# Patient Record
Sex: FEMALE | Race: WHITE | NOT HISPANIC OR LATINO | Employment: OTHER | URBAN - METROPOLITAN AREA
[De-identification: names, ages, dates, MRNs, and addresses within clinical notes are randomized per-mention and may not be internally consistent; named-entity substitution may affect disease eponyms.]

---

## 2017-01-12 ENCOUNTER — OFFICE VISIT (OUTPATIENT)
Dept: ORTHOPEDICS | Facility: CLINIC | Age: 67
End: 2017-01-12

## 2017-01-12 DIAGNOSIS — M17.32 POST-TRAUMATIC OSTEOARTHRITIS OF LEFT KNEE: Primary | ICD-10-CM

## 2017-01-12 ASSESSMENT — ENCOUNTER SYMPTOMS
RECTAL PAIN: 0
ABDOMINAL PAIN: 0
VOMITING: 0
HOT FLASHES: 0
TACHYCARDIA: 0
SYNCOPE: 0
BLOOD IN STOOL: 0
EXERCISE INTOLERANCE: 1
FLANK PAIN: 0
JOINT SWELLING: 1
SINUS PAIN: 0
PALPITATIONS: 0
DIFFICULTY URINATING: 0
MUSCLE CRAMPS: 1
NAUSEA: 0
SINUS CONGESTION: 0
MUSCLE WEAKNESS: 1
LIGHT-HEADEDNESS: 0
DIARRHEA: 1
NECK PAIN: 0
MYALGIAS: 1
BLOATING: 0
HOARSE VOICE: 0
RECTAL BLEEDING: 0
HEMATURIA: 1
HEARTBURN: 0
TROUBLE SWALLOWING: 1
HYPOTENSION: 0
HYPERTENSION: 0
TASTE DISTURBANCE: 0
CONSTIPATION: 0
STIFFNESS: 0
JAUNDICE: 0
CLAUDICATION: 1
LEG PAIN: 1
SORE THROAT: 0
DYSURIA: 0
ARTHRALGIAS: 1
BOWEL INCONTINENCE: 0
DECREASED LIBIDO: 1
SLEEP DISTURBANCES DUE TO BREATHING: 0
BACK PAIN: 1
LEG SWELLING: 1
ORTHOPNEA: 0

## 2017-01-12 ASSESSMENT — KOOS JR
HOW SEVERE IS YOUR KNEE STIFFNESS AFTER FIRST WAKING IN MORNING: MODERATE
HOW SEVERE IS YOUR KNEE STIFFNESS AFTER FIRST WAKING IN MORNING: 1

## 2017-01-12 ASSESSMENT — ACTIVITIES OF DAILY LIVING (ADL): FUNCTION,_DAILY_LIVING_SCORE: 55.88

## 2017-01-12 NOTE — PROGRESS NOTES
Jada is seen in preoperative consultation for upcoming left knee surgery.  MRI was reviewed and obtained today which further reiterates the fact she is a great candidate for left knee medial Uni arthroplasty.  MRI was reviewed.  X-rays were reviewed.  She continues to localize her pain as 100% medial.  At this time, we will go ahead and schedule her left knee medial Uni arthroplasty with total knee backup.  Risks, benefits and postoperative complications were all discussed in detail.      Total time spent was 30 minutes in face-to-face consultation and preoperative planning.      DIAGNOSIS:  Left knee medial posttraumatic osteoarthritis.

## 2017-01-12 NOTE — Clinical Note
1/12/2017       RE: Jada Billings  [address] [facility] [address]     Dear Colleague,    Thank you for referring your patient, Jada Billings, to the [facility]TH[facility]IC at [facility]. Please see a copy of my visit note below.    Jdaa is seen in preoperative consultation for upcoming left knee surgery.  MRI was reviewed and obtained today which further reiterates the fact she is a great candidate for left knee medial Uni arthroplasty.  MRI was reviewed.  X-rays were reviewed.  She continues to localize her pain as 100% medial.  At this time, we will go ahead and schedule her left knee medial Uni arthroplasty with total knee backup.  Risks, benefits and postoperative complications were all discussed in detail.      Total time spent was 30 minutes in face-to-face consultation and preoperative planning.      DIAGNOSIS:  Left knee medial posttraumatic osteoarthritis.       Again, thank you for allowing me to participate in the care of your patient.      Sincerely,    Edin Abraham MD

## 2017-03-21 ENCOUNTER — HOSPITAL ENCOUNTER (OUTPATIENT)
Dept: EDUCATION SERVICES | Facility: CLINIC | Age: 67
End: 2017-03-21
Payer: MEDICARE

## 2017-04-06 DIAGNOSIS — M17.12 PRIMARY OSTEOARTHRITIS OF LEFT KNEE: Primary | ICD-10-CM

## 2017-04-13 ENCOUNTER — APPOINTMENT (OUTPATIENT)
Dept: GENERAL RADIOLOGY | Facility: CLINIC | Age: 67
End: 2017-04-13
Attending: ORTHOPAEDIC SURGERY
Payer: MEDICARE

## 2017-04-13 ENCOUNTER — SURGERY (OUTPATIENT)
Age: 67
End: 2017-04-13

## 2017-04-13 ENCOUNTER — ANESTHESIA (OUTPATIENT)
Dept: SURGERY | Facility: CLINIC | Age: 67
End: 2017-04-13
Payer: MEDICARE

## 2017-04-13 PROBLEM — Z41.9 ELECTIVE SURGERY: Status: ACTIVE | Noted: 2017-04-13

## 2017-04-13 PROBLEM — Z98.890 STATUS POST KNEE SURGERY: Status: ACTIVE | Noted: 2017-04-13

## 2017-04-13 PROBLEM — M17.9 KNEE OSTEOARTHRITIS: Status: ACTIVE | Noted: 2017-04-13

## 2017-04-13 PROCEDURE — C9290 INJ, BUPIVACAINE LIPOSOME: HCPCS | Performed by: ANESTHESIOLOGY

## 2017-04-13 PROCEDURE — 25000125 ZZHC RX 250: Performed by: NURSE ANESTHETIST, CERTIFIED REGISTERED

## 2017-04-13 PROCEDURE — 40000986 XR KNEE PORT LT 1/2 VW: Mod: LT

## 2017-04-13 PROCEDURE — 25000128 H RX IP 250 OP 636: Performed by: ANESTHESIOLOGY

## 2017-04-13 PROCEDURE — 25000125 ZZHC RX 250: Performed by: STUDENT IN AN ORGANIZED HEALTH CARE EDUCATION/TRAINING PROGRAM

## 2017-04-13 PROCEDURE — 25000128 H RX IP 250 OP 636: Performed by: NURSE ANESTHETIST, CERTIFIED REGISTERED

## 2017-04-13 PROCEDURE — 25000125 ZZHC RX 250: Performed by: ORTHOPAEDIC SURGERY

## 2017-04-13 PROCEDURE — 25800025 ZZH RX 258: Performed by: NURSE ANESTHETIST, CERTIFIED REGISTERED

## 2017-04-13 PROCEDURE — S0077 INJECTION, CLINDAMYCIN PHOSP: HCPCS | Performed by: ORTHOPAEDIC SURGERY

## 2017-04-13 PROCEDURE — 25000128 H RX IP 250 OP 636: Performed by: ORTHOPAEDIC SURGERY

## 2017-04-13 RX ORDER — BUPIVACAINE HYDROCHLORIDE AND EPINEPHRINE 2.5; 5 MG/ML; UG/ML
INJECTION, SOLUTION INFILTRATION; PERINEURAL PRN
Status: DISCONTINUED | OUTPATIENT
Start: 2017-04-13 | End: 2017-04-13

## 2017-04-13 RX ORDER — ONDANSETRON 2 MG/ML
INJECTION INTRAMUSCULAR; INTRAVENOUS PRN
Status: DISCONTINUED | OUTPATIENT
Start: 2017-04-13 | End: 2017-04-13

## 2017-04-13 RX ORDER — SODIUM CHLORIDE, SODIUM LACTATE, POTASSIUM CHLORIDE, CALCIUM CHLORIDE 600; 310; 30; 20 MG/100ML; MG/100ML; MG/100ML; MG/100ML
INJECTION, SOLUTION INTRAVENOUS CONTINUOUS PRN
Status: DISCONTINUED | OUTPATIENT
Start: 2017-04-13 | End: 2017-04-13

## 2017-04-13 RX ORDER — FENTANYL CITRATE 50 UG/ML
INJECTION, SOLUTION INTRAMUSCULAR; INTRAVENOUS PRN
Status: DISCONTINUED | OUTPATIENT
Start: 2017-04-13 | End: 2017-04-13

## 2017-04-13 RX ORDER — DEXAMETHASONE SODIUM PHOSPHATE 4 MG/ML
INJECTION, SOLUTION INTRA-ARTICULAR; INTRALESIONAL; INTRAMUSCULAR; INTRAVENOUS; SOFT TISSUE PRN
Status: DISCONTINUED | OUTPATIENT
Start: 2017-04-13 | End: 2017-04-13

## 2017-04-13 RX ORDER — PROPOFOL 10 MG/ML
INJECTION, EMULSION INTRAVENOUS CONTINUOUS PRN
Status: DISCONTINUED | OUTPATIENT
Start: 2017-04-13 | End: 2017-04-13

## 2017-04-13 RX ORDER — LIDOCAINE HYDROCHLORIDE 20 MG/ML
INJECTION, SOLUTION INFILTRATION; PERINEURAL PRN
Status: DISCONTINUED | OUTPATIENT
Start: 2017-04-13 | End: 2017-04-13

## 2017-04-13 RX ADMIN — PHENYLEPHRINE HYDROCHLORIDE 100 MCG: 10 INJECTION, SOLUTION INTRAMUSCULAR; INTRAVENOUS; SUBCUTANEOUS at 09:12

## 2017-04-13 RX ADMIN — PHENYLEPHRINE HYDROCHLORIDE 100 MCG: 10 INJECTION, SOLUTION INTRAMUSCULAR; INTRAVENOUS; SUBCUTANEOUS at 09:32

## 2017-04-13 RX ADMIN — PROPOFOL 125 MCG/KG/MIN: 10 INJECTION, EMULSION INTRAVENOUS at 08:29

## 2017-04-13 RX ADMIN — ONDANSETRON 4 MG: 2 INJECTION INTRAMUSCULAR; INTRAVENOUS at 09:55

## 2017-04-13 RX ADMIN — LIDOCAINE HYDROCHLORIDE 20 MG: 20 INJECTION, SOLUTION INFILTRATION; PERINEURAL at 08:29

## 2017-04-13 RX ADMIN — PHENYLEPHRINE HYDROCHLORIDE 100 MCG: 10 INJECTION, SOLUTION INTRAMUSCULAR; INTRAVENOUS; SUBCUTANEOUS at 09:17

## 2017-04-13 RX ADMIN — BUPIVACAINE HYDROCHLORIDE AND EPINEPHRINE BITARTRATE 4 ML: 5; .005 INJECTION, SOLUTION PERINEURAL at 08:55

## 2017-04-13 RX ADMIN — CLINDAMYCIN PHOSPHATE 900 MG: 18 INJECTION, SOLUTION INTRAVENOUS at 08:20

## 2017-04-13 RX ADMIN — SODIUM CHLORIDE 1000 ML: 900 IRRIGANT IRRIGATION at 10:03

## 2017-04-13 RX ADMIN — DEXAMETHASONE SODIUM PHOSPHATE 4 MG: 4 INJECTION, SOLUTION INTRAMUSCULAR; INTRAVENOUS at 08:35

## 2017-04-13 RX ADMIN — SODIUM CHLORIDE, POTASSIUM CHLORIDE, SODIUM LACTATE AND CALCIUM CHLORIDE: 600; 310; 30; 20 INJECTION, SOLUTION INTRAVENOUS at 08:20

## 2017-04-13 RX ADMIN — ROPIVACAINE HYDROCHLORIDE 50 ML: 10 INJECTION, SOLUTION EPIDURAL at 09:52

## 2017-04-13 RX ADMIN — BUPIVACAINE HYDROCHLORIDE AND EPINEPHRINE BITARTRATE 10 ML: 2.5; .005 INJECTION, SOLUTION INFILTRATION; PERINEURAL at 07:30

## 2017-04-13 RX ADMIN — FENTANYL CITRATE 50 MCG: 50 INJECTION, SOLUTION INTRAMUSCULAR; INTRAVENOUS at 08:20

## 2017-04-13 RX ADMIN — MIDAZOLAM HYDROCHLORIDE 2 MG: 1 INJECTION, SOLUTION INTRAMUSCULAR; INTRAVENOUS at 08:20

## 2017-04-13 RX ADMIN — ROPIVACAINE HYDROCHLORIDE 50 ML: 10 INJECTION, SOLUTION EPIDURAL at 09:53

## 2017-04-13 RX ADMIN — SODIUM CHLORIDE 1 G: 9 INJECTION, SOLUTION INTRAVENOUS at 08:33

## 2017-04-13 RX ADMIN — BUPIVACAINE 10 ML: 13.3 INJECTION, SUSPENSION, LIPOSOMAL INFILTRATION at 07:30

## 2017-04-14 ENCOUNTER — APPOINTMENT (OUTPATIENT)
Dept: OCCUPATIONAL THERAPY | Facility: CLINIC | Age: 67
End: 2017-04-14
Attending: ORTHOPAEDIC SURGERY
Payer: MEDICARE

## 2017-04-14 ENCOUNTER — APPOINTMENT (OUTPATIENT)
Dept: PHYSICAL THERAPY | Facility: CLINIC | Age: 67
End: 2017-04-14
Attending: ORTHOPAEDIC SURGERY
Payer: MEDICARE

## 2017-04-19 DIAGNOSIS — Z96.652 STATUS POST LEFT UNICOMPARTMENTAL KNEE REPLACEMENT: Primary | ICD-10-CM

## 2017-04-27 ENCOUNTER — OFFICE VISIT (OUTPATIENT)
Dept: ORTHOPEDICS | Facility: CLINIC | Age: 67
End: 2017-04-27

## 2017-04-27 DIAGNOSIS — Z96.652 STATUS POST LEFT PARTIAL KNEE REPLACEMENT: Primary | ICD-10-CM

## 2017-04-27 RX ORDER — OXYCODONE HYDROCHLORIDE 5 MG/1
5 TABLET ORAL EVERY 4 HOURS PRN
Qty: 40 TABLET | Refills: 0 | Status: SHIPPED | OUTPATIENT
Start: 2017-04-27 | End: 2017-05-08

## 2017-04-27 NOTE — LETTER
4/27/2017       RE: Jada Billigns  [address] [facility] [address]     Dear Colleague,    Thank you for referring your patient, Jada Billings, to the [facility] ORTHOPAEDIC CLINIC at [facility]. Please see a copy of my visit note below.    HISTORY OF PRESENT ILLNESS:  Jada is seen 2 weeks post op of her left knee medial uni arthroplasty.  She reports marked improvement compared to her preoperative pain.  Denies fever or chills.  She is taking oxycodone every 6-7 hours for pain, and is weaning off of this.  She denies night pain.  She is doing outpatient PT now.      PHYSICAL EXAMINATION:  Incision is seen; clean, healed, benign, and intact with no acute neurocirculatory deficits or drainage.  She has mild swelling.  Alignment is near neutral.  She has -8 degrees to 100 degrees of motion today.  Quad strength is 5/5 with no acute neurocirculatory deficits.      IMAGING:  X-rays were taken, which show good fit of the tibiofemoral components with ideal alignment.      PLAN:  The natural progression and plan of care was discussed.  I did reassure her that she is progressing well.  She will continue to focus on extension exercises.  She will continue the aspirin daily for DVT prophylaxis.  She will follow up in 4-6 weeks for an AP and lateral x-ray of her left knee on arrival, or sooner should she have increasing pain, problems or other complications.         Again, thank you for allowing me to participate in the care of your patient.      Sincerely,    MELA Mcdermott CNP

## 2017-04-27 NOTE — MR AVS SNAPSHOT
After Visit Summary   2017    Jada Billings    MRN: [id number]           Patient Information     Date Of Birth          1950        Visit Information        Provider Department      2017 10:45 AM Lupe Du, APRN CNP [facility]opa[facility]        Today's Diagnoses     Status post left partial knee replacement    -  1       Follow-ups after your visit        Your next 10 appointments already scheduled     May 25, 2017  2:30 PM CDT   (Arrive by 2:15 PM)   Return Visit with MD NIC Melgar [facility] ([facility] [place])    [address] [place] [address] [phone]              Who to contact     Please call your clinic at [phone] to:    Ask questions about your health    Make or cancel appointments    Discuss your medicines    Learn about your test results    Speak to your doctor   If you have compliments or concerns about an experience at your clinic, or if you wish to file a complaint, please contact [facility] Patient Relations at [phone] or email us at [email or web address]         Additional Information About Your Visit        MyChart Information     OrthoSensort is an electronic gateway that provides easy, online access to your medical records. With [employer], you can request a clinic appointment, read your test results, renew a prescription or communicate with your care team.     To sign up for OrthoSensort visit the website at [email or web address]   You will be asked to enter the access code listed below, as well as some personal information. Please follow the directions to create your username and password.     Your access code is: [email or web address]  Expires: 2017  6:31 AM     Your access code will  in 90 days. If you need help or a new code, please contact your [facility] or call [phone] for  assistance.        Care EveryWhere ID     This is your Care EveryWhere ID. This could be used by other organizations to access your [place] medical records  [id number]         Blood Pressure from Last 3 Encounters:   04/14/17 121/71    Weight from Last 3 Encounters:   04/13/17 88.2 kg (194 lb 7.1 oz)   12/01/16 87.1 kg (192 lb)              Today, you had the following     No orders found for display         Today's Medication Changes          These changes are accurate as of: 4/27/17 11:59 PM.  If you have any questions, ask your nurse or doctor.               These medicines have changed or have updated prescriptions.        Dose/Directions    meloxicam 15 MG tablet   Commonly known as:  MOBIC   This may have changed:  additional instructions   Used for:  Post-traumatic osteoarthritis of left knee        Dose:  15 mg   Take 1 tablet (15 mg) by mouth daily   Quantity:  30 tablet   Refills:  3       * oxyCODONE 5 MG IR tablet   Commonly known as:  ROXICODONE   This may have changed:  Another medication with the same name was added. Make sure you understand how and when to take each.   Used for:  S/P knee replacement        Dose:  5-10 mg   Take 1-2 tablets (5-10 mg) by mouth every 3 hours as needed for pain or other (Moderate to Severe)   Quantity:  60 tablet   Refills:  0       * oxyCODONE 5 MG IR tablet   Commonly known as:  ROXICODONE   This may have changed:  You were already taking a medication with the same name, and this prescription was added. Make sure you understand how and when to take each.   Used for:  Status post left partial knee replacement   Changed by:  Lupe Du, MELA CNP        Dose:  5 mg   Take 1 tablet (5 mg) by mouth every 4 hours as needed for moderate to severe pain   Quantity:  40 tablet   Refills:  0       * Notice:  This list has 2 medication(s) that are the same as other medications prescribed for you. Read the directions carefully, and ask your doctor or other care provider  to review them with you.         Where to get your medicines      Some of these will need a paper prescription and others can be bought over the counter.  Ask your nurse if you have questions.     Bring a paper prescription for each of these medications     oxyCODONE 5 MG IR tablet                Primary Care Provider Office Phone # Fax #    Arlin Barrios [phone]       [facility] [address]  [place] [address]        Thank you!     Thank you for choosing [facility] ORTHOPAEDIC CLINIC  for your care. Our goal is always to provide you with excellent care. Hearing back from our patients is one way we can continue to improve our services. Please take a few minutes to complete the written survey that you may receive in the mail after your visit with us. Thank you!             Your Updated Medication List - Protect others around you: Learn how to safely use, store and throw away your medicines at www.disposemymeds.org.          This list is accurate as of: 4/27/17 11:59 PM.  Always use your most recent med list.                   Brand Name Dispense Instructions for use    amitriptyline 10 MG tablet    ELAVIL     Take 10 mg by mouth At Bedtime       aspirin  MG EC tablet     30 tablet    Take 1 tablet (325 mg) by mouth daily       clonazePAM 1 MG tablet    klonoPIN     one tablet at HS       CYMBALTA 30 MG EC capsule   Generic drug:  DULoxetine      one capsule daily in the evening       Dextran 70 0.1%-Hypromellose 0.3% 0.1-0.3 % Soln ophthalmic solution      As needed for dry eyes  Indications: DRY EYE       GENTEAL SEVERE 0.3 % Gel ophthalmic gel   Generic drug:  hypromellose      Use as needed at bedtime for dry eye       meloxicam 15 MG tablet    MOBIC    30 tablet    Take 1 tablet (15 mg) by mouth daily       niacin 500 MG CR capsule      Take 500 mg by mouth At Bedtime       * oxyCODONE 5 MG IR tablet    ROXICODONE    60 tablet    Take 1-2 tablets (5-10 mg)  by mouth every 3 hours as needed for pain or other (Moderate to Severe)       * oxyCODONE 5 MG IR tablet    ROXICODONE    40 tablet    Take 1 tablet (5 mg) by mouth every 4 hours as needed for moderate to severe pain       progesterone 100 MG capsule    PROMETRIUM     Take 100 mg by mouth At Bedtime 2 capsules       simvastatin 40 MG tablet    ZOCOR     Take 40 mg by mouth At Bedtime       SYSTANE ULTRA 0.4-0.3 % Soln ophthalmic solution   Generic drug:  polyethylene glycol 0.4%- propylene glycol 0.3%      Use as needed for dry eyes       VITAMIN D-1000 MAX ST 1000 UNITS Tabs   Generic drug:  cholecalciferol      Take 2,000 Units by mouth daily       * Notice:  This list has 2 medication(s) that are the same as other medications prescribed for you. Read the directions carefully, and ask your doctor or other care provider to review them with you.

## 2017-04-27 NOTE — PROGRESS NOTES
HISTORY OF PRESENT ILLNESS:  Jada is seen 2 weeks post op of her left knee medial uni arthroplasty.  She reports marked improvement compared to her preoperative pain.  Denies fever or chills.  She is taking oxycodone every 6-7 hours for pain, and is weaning off of this.  She denies night pain.  She is doing outpatient PT now.      PHYSICAL EXAMINATION:  Incision is seen; clean, healed, benign, and intact with no acute neurocirculatory deficits or drainage.  She has mild swelling.  Alignment is near neutral.  She has -8 degrees to 100 degrees of motion today.  Quad strength is 5/5 with no acute neurocirculatory deficits.      IMAGING:  X-rays were taken, which show good fit of the tibiofemoral components with ideal alignment.      PLAN:  The natural progression and plan of care was discussed.  I did reassure her that she is progressing well.  She will continue to focus on extension exercises.  She will continue the aspirin daily for DVT prophylaxis.  She will follow up in 4-6 weeks for an AP and lateral x-ray of her left knee on arrival, or sooner should she have increasing pain, problems or other complications.

## 2017-05-08 DIAGNOSIS — Z96.652 STATUS POST LEFT PARTIAL KNEE REPLACEMENT: ICD-10-CM

## 2017-05-08 RX ORDER — OXYCODONE HYDROCHLORIDE 5 MG/1
TABLET ORAL
Qty: 40 TABLET | Refills: 0 | Status: SHIPPED | OUTPATIENT
Start: 2017-05-08 | End: 2017-07-27

## 2017-05-08 NOTE — TELEPHONE ENCOUNTER
Orthopedic pt of Dr. Abraham s/p Left knee unicompartmental knee arthroplasty on 4/13/17. Jada calls today requesting refill of pain med. She's currently taking approx 1 oxycodone every 6-8 hours. Will refill per standing orders. Jada states she's completely out of med and requests to p/u at the [facility].  Signed Rx taken to first floor for p/u.

## 2017-05-15 DIAGNOSIS — Z96.652 STATUS POST LEFT UNICOMPARTMENTAL KNEE REPLACEMENT: Primary | ICD-10-CM

## 2017-05-25 ENCOUNTER — OFFICE VISIT (OUTPATIENT)
Dept: ORTHOPEDICS | Facility: CLINIC | Age: 67
End: 2017-05-25

## 2017-05-25 DIAGNOSIS — Z96.652 STATUS POST LEFT PARTIAL KNEE REPLACEMENT: Primary | ICD-10-CM

## 2017-05-25 ASSESSMENT — ENCOUNTER SYMPTOMS
SLEEP DISTURBANCES DUE TO BREATHING: 0
BACK PAIN: 1
TACHYCARDIA: 0
PALPITATIONS: 0
HYPERTENSION: 0
EXERCISE INTOLERANCE: 0
ORTHOPNEA: 0
MUSCLE CRAMPS: 0
LEG SWELLING: 1
HYPOTENSION: 0
LIGHT-HEADEDNESS: 0
NECK PAIN: 0
SYNCOPE: 0
JOINT SWELLING: 1
LEG PAIN: 1
MUSCLE WEAKNESS: 1
MYALGIAS: 1
ARTHRALGIAS: 1
STIFFNESS: 1
CLAUDICATION: 0

## 2017-05-25 NOTE — PROGRESS NOTES
HISTORY OF PRESENT ILLNESS:  Jada is 6 weeks post her left medial Uni arthroplasty.  She reports marked improved compared to preoperative pain.  She is able to walk comfortably now, is taking nothing for pain.  Continues to do rehabilitation.      PHYSICAL EXAMINATION:  Incision is seen clean, healed, benign and intact.  She has 0-124 degrees of motion.  She has no acute neurocirculatory deficits, no pain to palpate the calf.  No erythema, no effusion.  Alignment is near neutral.      IMAGING:  X-rays were taken, which show good fit of the tibial and femoral components with ideal alignment.      DIAGNOSIS:  Left knee medial Uni arthroplasty.      PLAN:  The natural progression and plan of care was discussed.  She will continue to work on range of motion, isometrics and strengthening, following up in 2-3 months for AP/lateral x-ray of her left knee on arrival.

## 2017-05-25 NOTE — LETTER
5/25/2017       RE: Jada Billings  [address] [facility] [address]     Dear Colleague,    Thank you for referring your patient, Jada Billings, to the [facility] ORTHOPAEDIC CLINIC at [facility]. Please see a copy of my visit note below.     Dictation on: 05/25/2017  3:22 PM by: LAINE FERGUSON [[email or web address]]         HISTORY OF PRESENT ILLNESS:  Jada is 6 weeks post her left medial Uni arthroplasty.  She reports marked improved compared to preoperative pain.  She is able to walk comfortably now, is taking nothing for pain.  Continues to do rehabilitation.      PHYSICAL EXAMINATION:  Incision is seen clean, healed, benign and intact.  She has 0-124 degrees of motion.  She has no acute neurocirculatory deficits, no pain to palpate the calf.  No erythema, no effusion.  Alignment is near neutral.      IMAGING:  X-rays were taken, which show good fit of the tibial and femoral components with ideal alignment.      DIAGNOSIS:  Left knee medial Uni arthroplasty.      PLAN:  The natural progression and plan of care was discussed.  She will continue to work on range of motion, isometrics and strengthening, following up in 2-3 months for AP/lateral x-ray of her left knee on arrival.      Again, thank you for allowing me to participate in the care of your patient.      Sincerely,  Edin Abraham MD

## 2017-05-25 NOTE — MR AVS SNAPSHOT
After Visit Summary   2017    Jada Billings    MRN: [id number]           Patient Information     Date Of Birth          1950        Visit Information        Provider Department      2017 2:30 PM Edin Abraham MD [facility]        Today's Diagnoses     Status post left partial knee replacement    -  1       Follow-ups after your visit        Your next 10 appointments already scheduled     2017  2:30 PM CDT   (Arrive by 2:15 PM)   Return Visit with MD NIC Melgar [facility] ([facility] [place])    [address] [place] [address] [phone]              Who to contact     Please call your clinic at [phone] to:    Ask questions about your health    Make or cancel appointments    Discuss your medicines    Learn about your test results    Speak to your doctor   If you have compliments or concerns about an experience at your clinic, or if you wish to file a complaint, please contact [facility] Patient Relations at [phone] or email us at [email or web address]         Additional Information About Your Visit        MyChart Information     Pro[employer]t is an electronic gateway that provides easy, online access to your medical records. With [employer], you can request a clinic appointment, read your test results, renew a prescription or communicate with your care team.     To sign up for Pro[employer]t visit the website at [email or web address]   You will be asked to enter the access code listed below, as well as some personal information. Please follow the directions to create your username and password.     Your access code is: [email or web address]  Expires: 2017  6:31 AM     Your access code will  in 90 days. If you need help or a new code, please contact your [facility] or call [phone] for assistance.         Care EveryWhere ID     This is your Care EveryWhere ID. This could be used by other organizations to access your [place] medical records  [id number]         Blood Pressure from Last 3 Encounters:   No data found for BP    Weight from Last 3 Encounters:   No data found for Wt              Today, you had the following     No orders found for display         Today's Medication Changes          These changes are accurate as of: 5/25/17 11:59 PM.  If you have any questions, ask your nurse or doctor.               These medicines have changed or have updated prescriptions.        Dose/Directions    oxyCODONE 5 MG IR tablet   Commonly known as:  ROXICODONE   This may have changed:  Another medication with the same name was removed. Continue taking this medication, and follow the directions you see here.   Used for:  Status post left partial knee replacement   Changed by:  Edin Abraham MD        Take 1 tablet by mouth every 6-8 hours, AS NEEDED, for moderate to severe post-op pain. Wean as tolerated.   Quantity:  40 tablet   Refills:  0         Stop taking these medicines if you haven't already. Please contact your care team if you have questions.     meloxicam 15 MG tablet   Commonly known as:  MOBIC   Stopped by:  Edin Abraham MD                    Primary Care Provider Office Phone # Fax #    Arlin Barrios [phone]       [facility] [address]  [place] [address]        Thank you!     Thank you for choosing [facility]  for your care. Our goal is always to provide you with excellent care. Hearing back from our patients is one way we can continue to improve our services. Please take a few minutes to complete the written survey that you may receive in the mail after your visit with us. Thank you!             Your Updated Medication List - Protect others around you: Learn how to safely use, store and throw away your medicines at  www.disposemymeds.org.          This list is accurate as of: 5/25/17 11:59 PM.  Always use your most recent med list.                   Brand Name Dispense Instructions for use    amitriptyline 10 MG tablet    ELAVIL     Take 10 mg by mouth At Bedtime       aspirin  MG EC tablet     30 tablet    Take 1 tablet (325 mg) by mouth daily       clonazePAM 1 MG tablet    klonoPIN     one tablet at HS       CYMBALTA 30 MG EC capsule   Generic drug:  DULoxetine      one capsule daily in the evening       Dextran 70 0.1%-Hypromellose 0.3% 0.1-0.3 % Soln ophthalmic solution      As needed for dry eyes  Indications: DRY EYE       DICLOFENAC SODIUM PO          GENTEAL SEVERE 0.3 % Gel ophthalmic gel   Generic drug:  hypromellose      Use as needed at bedtime for dry eye       niacin 500 MG CR capsule      Take 500 mg by mouth At Bedtime       oxyCODONE 5 MG IR tablet    ROXICODONE    40 tablet    Take 1 tablet by mouth every 6-8 hours, AS NEEDED, for moderate to severe post-op pain. Wean as tolerated.       progesterone 100 MG capsule    PROMETRIUM     Take 100 mg by mouth At Bedtime 2 capsules       simvastatin 40 MG tablet    ZOCOR     Take 40 mg by mouth At Bedtime       SYSTANE ULTRA 0.4-0.3 % Soln ophthalmic solution   Generic drug:  polyethylene glycol 0.4%- propylene glycol 0.3%      Use as needed for dry eyes       VITAMIN D-1000 MAX ST 1000 UNITS Tabs   Generic drug:  cholecalciferol      Take 2,000 Units by mouth daily

## 2017-07-21 DIAGNOSIS — Z96.652 STATUS POST LEFT UNICOMPARTMENTAL KNEE REPLACEMENT: Primary | ICD-10-CM

## 2017-07-27 ENCOUNTER — OFFICE VISIT (OUTPATIENT)
Dept: ORTHOPEDICS | Facility: CLINIC | Age: 67
End: 2017-07-27

## 2017-07-27 DIAGNOSIS — Z96.652 STATUS POST LEFT PARTIAL KNEE REPLACEMENT: Primary | ICD-10-CM

## 2017-07-27 RX ORDER — GABAPENTIN 300 MG/1
300 CAPSULE ORAL
Qty: 30 CAPSULE | Refills: 0 | Status: SHIPPED | OUTPATIENT
Start: 2017-07-27

## 2017-07-27 NOTE — MR AVS SNAPSHOT
After Visit Summary   2017    Jada Billings    MRN: [id number]           Patient Information     Date Of Birth          1950        Visit Information        Provider Department      2017 2:30 PM Eidn Abraham MD [facility]        Today's Diagnoses     Status post left partial knee replacement    -  1       Follow-ups after your visit        Who to contact     Please call your clinic at [phone] to:    Ask questions about your health    Make or cancel appointments    Discuss your medicines    Learn about your test results    Speak to your doctor   If you have compliments or concerns about an experience at your clinic, or if you wish to file a complaint, please contact [facility] Patient Relations at [phone] or email us at [email or web address]         Additional Information About Your Visit        MyChart Information     C[employer] is an electronic gateway that provides easy, online access to your medical records. With [employer], you can request a clinic appointment, read your test results, renew a prescription or communicate with your care team.     To sign up for [employer] visit the website at [email or web address]   You will be asked to enter the access code listed below, as well as some personal information. Please follow the directions to create your username and password.     Your access code is: [email or web address]  Expires: 10/11/2017  6:31 AM     Your access code will  in 90 days. If you need help or a new code, please contact your [facility] or call [phone] for assistance.        Care EveryWhere ID     This is your Care EveryWhere ID. This could be used by other organizations to access your [place] medical records  [id number]         Blood Pressure from Last 3 Encounters:   No data found for BP    Weight from Last 3 Encounters:   No data found for Wt               Today, you had the following     No orders found for display         Today's Medication Changes          These changes are accurate as of: 7/27/17 11:59 PM.  If you have any questions, ask your nurse or doctor.               Start taking these medicines.        Dose/Directions    gabapentin 300 MG capsule   Commonly known as:  NEURONTIN   Used for:  Status post left partial knee replacement   Started by:  Edin Abraham MD        Dose:  300 mg   Take 1 capsule (300 mg) by mouth nightly as needed   Quantity:  30 capsule   Refills:  0         Stop taking these medicines if you haven't already. Please contact your care team if you have questions.     oxyCODONE 5 MG IR tablet   Commonly known as:  ROXICODONE   Stopped by:  Edin Abraham MD                Where to get your medicines      These medications were sent to [facility] [address] [place] [address]     Phone:  [phone]     gabapentin 300 MG capsule                Primary Care Provider Office Phone # Fax #    Arlin Isbellery [phone]       [facility] [address]  [place] [address]        Equal Access to Services     SEAN [facility] SystemGAMAL AH: Hadii alfredo ku hadasho Soomaali, waaxda luqadaha, qaybta kaalmada adeegyada, maria luisa calloway . So [facility] [phone].    ATENCIÓN: Si habla español, tiene a mckeon disposición servicios gratuitos de asistencia lingüística. [facility] [phone].    We comply with applicable federal civil rights laws and [place] laws. We do not discriminate on the basis of race, color, national origin, age, disability sex, sexual orientation or gender identity.            Thank you!     Thank you for choosing [facility]  for your care. Our goal is always to provide you with excellent care. Hearing back from our patients is one way we can continue to improve our services. Please take a few  minutes to complete the written survey that you may receive in the mail after your visit with us. Thank you!             Your Updated Medication List - Protect others around you: Learn how to safely use, store and throw away your medicines at www.disposemymeds.org.          This list is accurate as of: 7/27/17 11:59 PM.  Always use your most recent med list.                   Brand Name Dispense Instructions for use Diagnosis    amitriptyline 10 MG tablet    ELAVIL     Take 10 mg by mouth At Bedtime        aspirin  MG EC tablet     30 tablet    Take 1 tablet (325 mg) by mouth daily    S/P knee replacement       clonazePAM 1 MG tablet    klonoPIN     one tablet at HS        CYMBALTA 30 MG EC capsule   Generic drug:  DULoxetine      one capsule daily in the evening        Dextran 70 0.1%-Hypromellose 0.3% 0.1-0.3 % Soln ophthalmic solution      As needed for dry eyes  Indications: DRY EYE        DICLOFENAC SODIUM PO           gabapentin 300 MG capsule    NEURONTIN    30 capsule    Take 1 capsule (300 mg) by mouth nightly as needed    Status post left partial knee replacement       GENTEAL SEVERE 0.3 % Gel ophthalmic gel   Generic drug:  hypromellose      Use as needed at bedtime for dry eye        niacin 500 MG CR capsule      Take 500 mg by mouth At Bedtime        progesterone 100 MG capsule    PROMETRIUM     Take 100 mg by mouth At Bedtime 2 capsules        simvastatin 40 MG tablet    ZOCOR     Take 40 mg by mouth At Bedtime        SYSTANE ULTRA 0.4-0.3 % Soln ophthalmic solution   Generic drug:  polyethylene glycol 0.4%- propylene glycol 0.3%      Use as needed for dry eyes        VITAMIN D-1000 MAX ST 1000 UNITS Tabs   Generic drug:  cholecalciferol      Take 2,000 Units by mouth daily

## 2017-07-27 NOTE — PROGRESS NOTES
"Jada is seen 3 1/2 months post left knee medial uniarthroplasty. She is doing well with continued reports of improvement in medial joint pain compared to her preop pain. Since our last visit she has been increasing her activity and has increased night pain and \"deep achiness\" with some medial knee pain distal to the joint. Taking OTC NSAIDS and using ice which provides temorary relief. Denies locking or catching and no specific new injuries. Reports some \"grinding\" that is not painful just annoying with squatting.     PMH: Reviewed    ROS: Reviewed    SH/FH:    PE:  Pleasant cooperative female alert and oriented x3. Knee incision is seen and well healed. ROM 0-112. Strength is symmetrical to the contralateral side. No erythema or joint effusion noted. + alignment.  Minimal pseudolaxity noted. Mild tender to palpate pes anserine bursa. Quad tendon intact. Minimal crepitus noted on passive motion to patella. + CMS      Xrays taken show good fit of tibial/femoral components without lucency. No acute fractures.     DX: left knee medial uniarthroplasty    Plan  1. Quad isometrics  2. neurontin 300 mg po q hs for night pain  3. Ice  4. F/U 1 year    I, Dr. Edin Abraham have seen and examined this patient with Lupe Du, APRN, CNP.      "

## 2017-07-27 NOTE — LETTER
"7/27/2017       RE: Jada Billings  [address]  [facility] [address]     Dear Colleague,    Thank you for referring your patient, Jada Bililngs, to the [facility] at [facility]. Please see a copy of my visit note below.    Jada is seen 3 1/2 months post left knee medial uniarthroplasty. She is doing well with continued reports of improvement in medial joint pain compared to her preop pain. Since our last visit she has been increasing her activity and has increased night pain and \"deep achiness\" with some medial knee pain distal to the joint. Taking OTC NSAIDS and using ice which provides temorary relief. Denies locking or catching and no specific new injuries. Reports some \"grinding\" that is not painful just annoying with squatting.     PMH: Reviewed    ROS: Reviewed    SH/FH:    PE:  Pleasant cooperative female alert and oriented x3. Knee incision is seen and well healed. ROM 0-112. Strength is symmetrical to the contralateral side. No erythema or joint effusion noted. + alignment.  Minimal pseudolaxity noted. Mild tender to palpate pes anserine bursa. Quad tendon intact. Minimal crepitus noted on passive motion to patella. + CMS      Xrays taken show good fit of tibial/femoral components without lucency. No acute fractures.     DX: left knee medial uniarthroplasty    Plan  1. Quad isometrics  2. neurontin 300 mg po q hs for night pain  3. Ice  4. F/U 1 year    Again, thank you for allowing me to participate in the care of your patient.      Sincerely,    Edin Abraham MD      "

## 2017-11-16 ENCOUNTER — TELEPHONE (OUTPATIENT)
Dept: ORTHOPEDICS | Facility: CLINIC | Age: 67
End: 2017-11-16

## 2017-11-16 DIAGNOSIS — M17.10 ARTHRITIS OF KNEE: Primary | ICD-10-CM

## 2017-12-06 ENCOUNTER — MEDICAL CORRESPONDENCE (OUTPATIENT)
Dept: HEALTH INFORMATION MANAGEMENT | Facility: CLINIC | Age: 67
End: 2017-12-06

## 2018-01-04 ENCOUNTER — MEDICAL CORRESPONDENCE (OUTPATIENT)
Dept: HEALTH INFORMATION MANAGEMENT | Facility: CLINIC | Age: 68
End: 2018-01-04

## 2018-05-15 DIAGNOSIS — Z96.652 STATUS POST LEFT UNICOMPARTMENTAL KNEE REPLACEMENT: Primary | ICD-10-CM

## 2018-05-21 ENCOUNTER — RADIANT APPOINTMENT (OUTPATIENT)
Dept: GENERAL RADIOLOGY | Facility: CLINIC | Age: 68
End: 2018-05-21
Attending: ORTHOPAEDIC SURGERY
Payer: COMMERCIAL

## 2018-05-21 ENCOUNTER — OFFICE VISIT (OUTPATIENT)
Dept: ORTHOPEDICS | Facility: CLINIC | Age: 68
End: 2018-05-21
Payer: COMMERCIAL

## 2018-05-21 DIAGNOSIS — M76.891 TENDINITIS INVOLVING RIGHT HIP ABDUCTORS: Primary | ICD-10-CM

## 2018-05-21 DIAGNOSIS — M17.31 POST-TRAUMATIC OSTEOARTHRITIS OF RIGHT KNEE: ICD-10-CM

## 2018-05-21 DIAGNOSIS — R29.898 WEAKNESS OF RIGHT HIP: ICD-10-CM

## 2018-05-21 DIAGNOSIS — Z96.652 STATUS POST LEFT UNICOMPARTMENTAL KNEE REPLACEMENT: ICD-10-CM

## 2018-05-21 RX ORDER — TRIAMCINOLONE ACETONIDE 40 MG/ML
40 INJECTION, SUSPENSION INTRA-ARTICULAR; INTRAMUSCULAR ONCE
Status: ACTIVE | OUTPATIENT
Start: 2018-05-21

## 2018-05-21 RX ORDER — LIDOCAINE HYDROCHLORIDE 10 MG/ML
2 INJECTION, SOLUTION INFILTRATION; PERINEURAL ONCE
Status: DISPENSED | OUTPATIENT
Start: 2018-05-21

## 2018-05-21 NOTE — NURSING NOTE
[address] [address]  Dept: [phone]  ______________________________________________________________________________    Patient: Jada Billings   : 1950   MRN: [id number]   May 21, 2018    INVASIVE PROCEDURE SAFETY CHECKLIST    Date: 18    Procedure:Right knee cortisone injection  Patient Name: Jada Billings  MRN: [id number]  YOB: 1950    Action: Complete sections as appropriate. Any discrepancy results in a HARD COPY until resolved.     PRE PROCEDURE:  Patient ID verified with 2 identifiers (name and  or MRN): Yes  Procedure and site verified with patient/designee (when able): Yes  Accurate consent documentation in medical record: Yes  H&P (or appropriate assessment) documented in medical record: NA  H&P must be up to 20 days prior to procedure and updates within 24 hours of procedure as applicable: NA  Relevant diagnostic and radiology test results appropriately labeled and displayed as applicable: Yes  Procedure site(s) marked with provider initials: NA    TIMEOUT:  Time-Out performed immediately prior to starting procedure, including verbal and active participation of all team members addressing the following:Yes  * Correct patient identify  * Confirmed that the correct side and site are marked  * An accurate procedure consent form  * Agreement on the procedure to be done  * Correct patient position  * Relevant images and results are properly labeled and appropriately displayed  * The need to administer antibiotics or fluids for irrigation purposes during the procedure as applicable   * Safety precautions based on patient history or medication use    DURING PROCEDURE: Verification of correct person, site, and procedures any time the responsibility for care of the patient is transferred to another member of the care team.     The following medication was given:   MEDICATION:  Lidocaine without  epinephrine  ROUTE: IA  SITE: right knee  DOSE: 2ml  LOT #: [id number]  : [employer]  EXPIRATION DATE: 02/22  NDC#: 52871-409-13   Was there drug waste? Yes  Amount of drug waste (mL): 3.  Reason for waste:  Single use vial    MEDICATION:  Kenalog 40 mg  ROUTE: IA  SITE: right knee  DOSE: 1ml  LOT #: [id number]  : [employer]  EXPIRATION DATE: 01/20  NDC#: [address]-5   Was there drug waste? No      Yehuda Marie, ATC  May 21, 2018

## 2018-05-21 NOTE — MR AVS SNAPSHOT
After Visit Summary   5/21/2018    Jada Billings    MRN: [id number]           Patient Information     Date Of Birth          1950        Visit Information        Provider Department      5/21/2018 8:45 AM Edin Abraham MD [facility]        Today's Diagnoses     Tendinitis involving right hip abductors    -  1    Weakness of right hip        Post-traumatic osteoarthritis of right knee           Follow-ups after your visit        Your next 10 appointments already scheduled     May 22, 2018  9:45 AM CDT   US EXTREMITY NON VASCULAR RIGHT with UCUS2   [facility] US ([facility])    [address] [address] [phone]           Please bring a list of your medicines (including vitamins, minerals and over-the-counter drugs). Also, tell your doctor about any allergies you may have. Wear comfortable clothes and leave your valuables at home.  You do not need to do anything special to prepare for your exam.  Please call the Imaging Department at your exam site with any questions.              Future tests that were ordered for you today     Open Future Orders        Priority Expected Expires Ordered    US Extremity non-vascular RT Routine  5/21/2019 5/21/2018            Who to contact     Please call your clinic at [phone] to:    Ask questions about your health    Make or cancel appointments    Discuss your medicines    Learn about your test results    Speak to your doctor            Additional Information About Your Visit        MyChart Information     Miaopai is an electronic gateway that provides easy, online access to your medical records. With [employer], you can request a clinic appointment, read your test results, renew a prescription or communicate with your care team.     To sign up for [employer] visit the website at [email or web address]   You will be asked to enter the access code  listed below, as well as some personal information. Please follow the directions to create your username and password.     Your access code is: [email or web address]  Expires: 2018  6:30 AM     Your access code will  in 90 days. If you need help or a new code, please contact your [facility] or call [phone] for assistance.        Care EveryWhere ID     This is your Care EveryWhere ID. This could be used by other organizations to access your [place] medical records  [id number]         Blood Pressure from Last 3 Encounters:   17 121/71    Weight from Last 3 Encounters:   17 88.2 kg (194 lb 7.1 oz)   16 87.1 kg (192 lb)              We Performed the Following     Large Joint/Bursa injection and/or drainage - Unilateral (Shoulder, Knee) []          Today's Medication Changes          These changes are accurate as of 18  3:00 PM.  If you have any questions, ask your nurse or doctor.               These medicines have changed or have updated prescriptions.        Dose/Directions    CLONAZEPAM PO   This may have changed:  Another medication with the same name was removed. Continue taking this medication, and follow the directions you see here.   Changed by:  Edin Abraham MD        Dose:  0.5 mg   Take 0.5 mg by mouth   Refills:  0         Stop taking these medicines if you haven't already. Please contact your care team if you have questions.     aspirin 325 MG EC tablet   Stopped by:  Edin Abraham MD           progesterone 100 MG capsule   Commonly known as:  PROMETRIUM   Stopped by:  Edin Abraham MD                    Primary Care Provider Office Phone # Fax #    Arlin Barrios [phone]       [facility] [address]  [place] [address]        Equal Access to Services     GLADIS SAHU : Jacqueline Herrera, malu cole, maria luisa paredes  omerorhiannonrobb portilloaablaise ah. So [facility] [phone].    ATENCIÓN: Si klaudia gilbert, tiene a mckeon disposición servicios gratuitos de asistencia lingüística. Klaudia al [phone].    We comply with applicable federal civil rights laws and [place] laws. We do not discriminate on the basis of race, color, national origin, age, disability, sex, sexual orientation, or gender identity.            Thank you!     Thank you for choosing [facility]  for your care. Our goal is always to provide you with excellent care. Hearing back from our patients is one way we can continue to improve our services. Please take a few minutes to complete the written survey that you may receive in the mail after your visit with us. Thank you!             Your Updated Medication List - Protect others around you: Learn how to safely use, store and throw away your medicines at www.disposemymeds.org.          This list is accurate as of 5/21/18  3:00 PM.  Always use your most recent med list.                   Brand Name Dispense Instructions for use Diagnosis    amitriptyline 10 MG tablet    ELAVIL     Take 10 mg by mouth At Bedtime        CLONAZEPAM PO      Take 0.5 mg by mouth        CYMBALTA 30 MG EC capsule   Generic drug:  DULoxetine      one capsule daily in the evening        Dextran 70 0.1%-Hypromellose 0.3% 0.1-0.3 % Soln ophthalmic solution      As needed for dry eyes  Indications: DRY EYE        DICLOFENAC SODIUM PO           gabapentin 300 MG capsule    NEURONTIN    30 capsule    Take 1 capsule (300 mg) by mouth nightly as needed    Status post left partial knee replacement       GENTEAL SEVERE 0.3 % Gel ophthalmic gel   Generic drug:  hypromellose      Use as needed at bedtime for dry eye        niacin 500 MG CR capsule      Take 500 mg by mouth At Bedtime        simvastatin 40 MG tablet    ZOCOR     Take 40 mg by mouth At Bedtime        SYSTANE ULTRA 0.4-0.3 % Soln ophthalmic solution   Generic drug:  polyethylene glycol 0.4%-  propylene glycol 0.3%      Use as needed for dry eyes        VITAMIN D-1000 MAX ST 1000 units Tabs   Generic drug:  cholecalciferol      Take 2,000 Units by mouth daily

## 2018-05-21 NOTE — LETTER
"5/21/2018       RE: Jada Billings  [address]  [place] [address]     Dear Colleague,    Thank you for referring your patient, Jada Billings, to the [facility] at [facility]. Please see a copy of my visit note below.    Cc: 1. Right knee pain  2. Follow up left medial uniarthroplasty  3. Right hip weakness    HPI:  Jada is seen at the 1 year follow up of her left partial medial knee replacement.4/13/2017. She is pleased with the pain relief and her improved function. She denies swelling but \"does ache once in a while after a long day\" but minimal. \"Not even enough where I have to take any medicine for it\". Rates the pain \"1 or 2\". Denies instability. Has done antibiotics for dental work as recommended by us.  She reports a 2 months history of \"slight right knee pain\". She reports \"the pain feels different than her right knee did\". She localizes the pain more \"to the outside\". Denies mechanical symptoms. Has occasional swelling with some night pain. She uses ice \"which helps her symptoms\".  Takes occasional aleve for it \"which helps for a while\". No specific injury noted.    PMH: Reviewed from chart on 7/27/2017.    ROS: Reviewed from tablet on 5/21/2018 and is negative except for    Meds/All: Reviewed from chart today 5/21/2018 and is updated with PCN allergy    PE:  Pleasant and cooperative female alert and oriented x3. Left knee reveals a well healed incision. Nontender to palpate medial and lateral joint line. Quad strength 5/5. No calf pain. No ligaments instability noted. ROM:0-122.  Alignment is unchanged and near neutral. No patella crepitus and nontender to palpate without apprehension. No joint effusion.    Right knee reveals tenderness to palpate lateral joint line. Nontender to medial joint line. Patella exam normal without pain or crepitus. ROM:-6-118. Strength 5/5 and symmetrical to contralateral side. Minimal " pseudolaxity in valgus/varus stress and 0, 45, and 90 degrees. No joint effusion. Slight valgus malaalignment on gait but minimal.    Right hip reveals no pain with passive internal and external rotation. Tender to palpate greater trochanter. Significant weakness with hip abduction and a positive Trendelenburg noted when compared to left side. Incision is well healed from a total hip replacement.    Xrays taken today show medial uniarthroplasty on left side with good preservation of lateral joint line. Right knee demonstrates lateral joint narrowing with good preservation to medial joint. Patella tracks appropriately without subluxation and is neutral alignment. No acute fractures noted.    Dx:  1.left knee partial medial knee replacement 4/17/2017  2. Early lateral compartment right knee osteoarthritis  3. Right hip abduction dysfunction/chronic tear    Plan:  1. Will continue to treat with prophylactic antibiotics life long for her knee replacement. Follow up every 2-3 years or sooner if symptoms worsen.  2. The natural progression of arthritis symptoms were discussed to her right knee including nsaids, injections, bracing, therapy, and activity modifications. Dr. Abraham suggested a cortisone injection today and to continue her diclofenac as needed. Informed consent obtained.  3. Will obtain a diagnostic US to evaluate the integrity of her hip abdcutors. If needed, a possible follow up to review if surgery indicated.    Total time spent was 30 minutes with greater than 50% spent in face to face consultation.    Procedure Note    Pre Procedure dx: right knee lateral compartment osteoarthritis    Post Procedure dx: same    Procedure: Informed consent obtained. Under sterile technique, the anterior lateral aspect of the right knee was prepped with chlorahexadine. 1cc of 40 mg of kenalog and 2 cc of lidocaine was injected into the lateral joint line without difficulty. Patient tolerated procedure well.    Dr. Edin COOPER  Jarad, have seen and examined this patient with MELA Kaur, CNP.      Again, thank you for allowing me to participate in the care of your patient.      Sincerely,    Edin Abraham MD

## 2018-05-21 NOTE — PROGRESS NOTES
"Cc: 1. Right knee pain  2. Follow up left medial uniarthroplasty  3. Right hip weakness    HPI:  Jada is seen at the 1 year follow up of her left partial medial knee replacement.4/13/2017. She is pleased with the pain relief and her improved function. She denies swelling but \"does ache once in a while after a long day\" but minimal. \"Not even enough where I have to take any medicine for it\". Rates the pain \"1 or 2\". Denies instability. Has done antibiotics for dental work as recommended by us.  She reports a 2 months history of \"slight right knee pain\". She reports \"the pain feels different than her right knee did\". She localizes the pain more \"to the outside\". Denies mechanical symptoms. Has occasional swelling with some night pain. She uses ice \"which helps her symptoms\".  Takes occasional aleve for it \"which helps for a while\". No specific injury noted.    PMH: Reviewed from chart on 7/27/2017.    ROS: Reviewed from tablet on 5/21/2018 and is negative except for    Meds/All: Reviewed from chart today 5/21/2018 and is updated with PCN allergy    PE:  Pleasant and cooperative female alert and oriented x3. Left knee reveals a well healed incision. Nontender to palpate medial and lateral joint line. Quad strength 5/5. No calf pain. No ligaments instability noted. ROM:0-122.  Alignment is unchanged and near neutral. No patella crepitus and nontender to palpate without apprehension. No joint effusion.    Right knee reveals tenderness to palpate lateral joint line. Nontender to medial joint line. Patella exam normal without pain or crepitus. ROM:-6-118. Strength 5/5 and symmetrical to contralateral side. Minimal pseudolaxity in valgus/varus stress and 0, 45, and 90 degrees. No joint effusion. Slight valgus malaalignment on gait but minimal.    Right hip reveals no pain with passive internal and external rotation. Tender to palpate greater trochanter. Significant weakness with hip abduction and a positive Trendelenburg " noted when compared to left side. Incision is well healed from a total hip replacement.    Xrays taken today show medial uniarthroplasty on left side with good preservation of lateral joint line. Right knee demonstrates lateral joint narrowing with good preservation to medial joint. Patella tracks appropriately without subluxation and is neutral alignment. No acute fractures noted.    Dx:  1.left knee partial medial knee replacement 4/17/2017  2. Early lateral compartment right knee osteoarthritis  3. Right hip abduction dysfunction/chronic tear    Plan:  1. Will continue to treat with prophylactic antibiotics life long for her knee replacement. Follow up every 2-3 years or sooner if symptoms worsen.  2. The natural progression of arthritis symptoms were discussed to her right knee including nsaids, injections, bracing, therapy, and activity modifications. Dr. Abraham suggested a cortisone injection today and to continue her diclofenac as needed. Informed consent obtained.  3. Will obtain a diagnostic US to evaluate the integrity of her hip abdcutors. If needed, a possible follow up to review if surgery indicated.    Total time spent was 30 minutes with greater than 50% spent in face to face consultation.    Procedure Note    Pre Procedure dx: right knee lateral compartment osteoarthritis    Post Procedure dx: same    Procedure: Informed consent obtained. Under sterile technique, the anterior lateral aspect of the right knee was prepped with chlorahexadine. 1cc of 40 mg of kenalog and 2 cc of lidocaine was injected into the lateral joint line without difficulty. Patient tolerated procedure well.    I, Dr. Edin Abraham, have seen and examined this patient with MELA Kaur, CNP.

## 2018-05-22 ENCOUNTER — RADIANT APPOINTMENT (OUTPATIENT)
Dept: ULTRASOUND IMAGING | Facility: CLINIC | Age: 68
End: 2018-05-22
Attending: ORTHOPAEDIC SURGERY
Payer: COMMERCIAL

## 2018-05-22 DIAGNOSIS — M76.891 TENDINITIS INVOLVING RIGHT HIP ABDUCTORS: ICD-10-CM

## 2018-05-22 DIAGNOSIS — R29.898 WEAKNESS OF RIGHT HIP: ICD-10-CM

## 2018-05-24 ENCOUNTER — TELEPHONE (OUTPATIENT)
Dept: ORTHOPEDICS | Facility: CLINIC | Age: 68
End: 2018-05-24

## 2018-06-11 ENCOUNTER — MEDICAL CORRESPONDENCE (OUTPATIENT)
Dept: HEALTH INFORMATION MANAGEMENT | Facility: CLINIC | Age: 68
End: 2018-06-11

## 2018-06-11 ENCOUNTER — THERAPY VISIT (OUTPATIENT)
Dept: PHYSICAL THERAPY | Facility: CLINIC | Age: 68
End: 2018-06-11
Payer: MEDICARE

## 2018-06-11 DIAGNOSIS — M25.551 HIP PAIN, RIGHT: Primary | ICD-10-CM

## 2018-06-11 PROCEDURE — 97110 THERAPEUTIC EXERCISES: CPT | Mod: GP | Performed by: PHYSICAL THERAPIST

## 2018-06-11 PROCEDURE — G8978 MOBILITY CURRENT STATUS: HCPCS | Mod: GP | Performed by: PHYSICAL THERAPIST

## 2018-06-11 PROCEDURE — G8979 MOBILITY GOAL STATUS: HCPCS | Mod: GP | Performed by: PHYSICAL THERAPIST

## 2018-06-11 PROCEDURE — 97162 PT EVAL MOD COMPLEX 30 MIN: CPT | Mod: GP | Performed by: PHYSICAL THERAPIST

## 2018-06-11 NOTE — PROGRESS NOTES
[facility] Initial Evaluation -- Lower Extremity    Evaluation Date: June 11, 2018  Jada Billings is a 67 year old female with a (R) hip condition.   Referral: Ortho  Work mechanical stresses: NA   Employment status: Retired  Leisure mechanical stresses: Walking 1 mi majority of days  Functional disability score: NA  VAS score (0-10): 6/10  Patient goals/expectations:  Reduce pain with walking    HISTORY:    Present symptoms: (R) lateral hip, groin and ant thigh  Pain quality (sharp/shooting/stabbing/aching/burning/cramping):  achy    Present since (onset date): 2 years.  MD referral date 5.24.18    Symptoms (improving/unchaning/worsening):  worsening.      Symptoms commenced as a result of: DJD.  TAMMY 8 yrs ago and revision 2 yrs ago   Condition occurred in the following environment: Home     Symptoms at onset: As above  Paresthesia (yes/no):  No  Spinal history: Yes - 10 yr history but progressively worse the past 2 years  Cough/Sneeze (pos/neg):  Neg    Constant symptoms: None  Intermittent symptoms: As above    Symptoms are worse with the following: Always First few steps, Always Walking, Always Stairs,Always on the move and Always Sleeping (prone/sup/side R/L) - Sides, Time of day - No effect   Symptoms are better with the following: Always When still    Continued use makes the pain (better/worse/no effect): Worse    Disturbed night (yes/no): Yes      Pain at rest (yes/no):  No  Site (back/hip/knee/ankle/foot):  NA    Other questions (swelling/clicking/locking/giving way/falling):  Giving way     Previous episodes: Yes  Previous treatments: PT after previous surgeries    Specific Questions:  General health (excellent/good/fair/poor):  Good  Pertinent medical history includes: Overweight  Medications (nil/NSAIDS/analg/steroids/anticoag/other):  Other - Sleep  Medical allergies:  PCN  Imaging (none/Xray/MRI/other):  US  Recent or major surgery (yes/no):  (R) TAMMY and revision as  stated above; (L) TAMMY 10 yrs ago, Lt partial knee replacement 1 yr ago; Lt hand; Rt shoulder  Night pain (yes/no):  No  Accidents (yes/no):  No  Unexplained weight loss (yes/no):  No  Barriers at home: None  Other red flags: None    Sites for physical examination (back/hip/knee/ankle/foot/other): back/hip    EXAMINATION    Posture:  Sitting (good/fair/poor): Fair    Correction of Posture (better/worse/no effect/NA): No effect  Standing (good/fair/poor):   Other observations:      Neurological: (NA/motor/sensory/reflexes/dural): NT    Baselines (pain or functional activity): Painful walking, first few steps from rising, stairs    Extremities (Hip / Knee / Ankle / Foot): Hip    Movement Loss Morales Mod Min Nil Pain   Flexion    X ERP   Extension    X    Abduction    X    Adduction    X    Internal Rotation    X    External Rotation    X ERP     Passive Movement (+/- over pressure)/(PDM/ERP):  WNL/ERP with flex and ER  Resisted Test Response (pain): Rt Hip Flex 4-/5; Hip Abd 4-/5; Hip Ext 4/5; Knee ext 5/5; Knee flex 5/5.  Other Tests: NT    Spine:  Movement loss: Flex WNL; Ext Min-Mod loss; SG WNL (B)  Effect of repeated movements: FISit - Produce ant hip, NW, NE ROM or pain with walking; EIS - NE, NE, Inc ROM (Ext), NE pain with walking  Effect of static positioning: NT  Spine testing (not relevant/relevant/secondary problem): Secondary    Baseline Symptoms: NA  Repeated Tests Symptom Response Mechanical Response   Active/Passive movement, resisted test, functional test During -  Produce, Abolish, Increase, Decrease, NE After -  Better, Worse, NB, NW, NE Effect -   ? or ? ROM, strength or key functional test No   Effect   NT       Effect of static positioning       NT         Provisional Classification (Extremity/Spine):  Extremity - Other - Post-Surgery      Princicple of Management:   Education:  Treatment plan and progression    Equipment provided:  None  Exercise and dosage:  Lumbar EIS x 10 reps, 2-3 x daily;  Bridging, SLR (Flex and Abd) x 10 reps ea progressing towards 30 reps 1 x daily and strengthening progression at subsequent visits.    ASSESSMENT/PLAN:    Patient is a 67 year old female with right side hip complaints.    Patient has the following significant findings with corresponding treatment plan.                Diagnosis 1:  (R) Hip Pain    Pain -  self management, education, directional preference exercise and home program  Decreased ROM/flexibility - manual therapy, therapeutic exercise and home program  Decreased joint mobility - manual therapy, therapeutic exercise and home program  Decreased strength - therapeutic exercise, therapeutic activities and home program  Impaired gait - gait training and home program  Impaired muscle performance - neuro re-education and home program  Decreased function - therapeutic activities and home program    Therapy Evaluation Codes:   1) History comprised of:   Personal factors that impact the plan of care:      None.    Comorbidity factors that impact the plan of care are:      Overweight.     Medications impacting care: Sleep.  2) Examination of Body Systems comprised of:   Body structures and functions that impact the plan of care:      Hip and Lumbar spine.   Activity limitations that impact the plan of care are:      Dressing, Squatting/kneeling, Stairs, Standing and Walking.  3) Clinical presentation characteristics are:   Evolving/Changing.  4) Decision-Making    Moderate complexity using standardized patient assessment instrument and/or measureable assessment of functional outcome.  Cumulative Therapy Evaluation is: Moderate complexity.    Previous and current functional limitations:  (See Goal Flow Sheet for this information)    Short term and Long term goals: (See Goal Flow Sheet for this information)     Communication ability:  Patient appears to be able to clearly communicate and understand verbal and written communication and follow directions  correctly.  Treatment Explanation - The following has been discussed with the patient:   RX ordered/plan of care  Anticipated outcomes  Possible risks and side effects  This patient would benefit from PT intervention to resume normal activities.   Rehab potential is good.    Frequency:  1 X week, once daily  Duration:  for 4 weeks  Discharge Plan:  Achieve all LTG.  Independent in home treatment program.  Reach maximal therapeutic benefit.    Please refer to the daily flowsheet for treatment today, total treatment time and time spent performing 1:1 timed codes.

## 2018-06-11 NOTE — LETTER
DEPARTMENT OF HEALTH AND HUMAN SERVICES  [facility] FOR MEDICARE & MEDICAID SERVICES    PLAN/UPDATED PLAN OF PROGRESS FOR OUTPATIENT REHABILITATION    PATIENTS NAME:  Jada Billings   : 1950    PROVIDER NUMBER:    [id number]  [facility]  [id number]     PROVIDER NAME: [place] [facility]  MEDICAL RECORD NUMBER: [id number]     START OF CARE DATE:  SOC Date: 18   TYPE:  PT    PRIMARY/TREATMENT DIAGNOSIS: (Pertinent Medical Diagnosis)  Hip pain, right  VISITS FROM START OF CARE:   1     [facility] Initial Evaluation -- Lower Extremity  Evaluation Date: 2018  Jada Billings is a 67 year old female with a (R) hip condition.   Referral: Ortho  Work mechanical stresses: NA   Employment status: Retired  Leisure mechanical stresses: Walking 1 mi majority of days  Functional disability score: NA  VAS score (0-10): 6/10  Patient goals/expectations:  Reduce pain with walking  HISTORY:  Present symptoms: (R) lateral hip, groin and ant thigh  Pain quality (sharp/shooting/stabbing/aching/burning/cramping):  achy  Present since (onset date): 2 years.  MD referral date 18    Symptoms (improving/unchaning/worsening):  worsening.    Symptoms commenced as a result of: DJD.  TAMMY 8 yrs ago and revision 2 yrs ago   Condition occurred in the following environment: Home   Symptoms at onset: As above    Paresthesia (yes/no):  No  Spinal history: Yes - 10 yr history but progressively worse the past 2 years  Cough/Sneeze (pos/neg):  Neg  Constant symptoms: None  Intermittent symptoms: As above  Symptoms are worse with the following: Always First few steps, Always Walking, Always Stairs,Always on the move and Always Sleeping (prone/sup/side R/L) - Sides, Time of day - No effect   Symptoms are better with the following: Always When still  Continued use makes the pain (better/worse/no effect): Worse  Disturbed night (yes/no): Yes    Pain at rest  (yes/no):  No    Site (back/hip/knee/ankle/foot):  NA  Other questions (swelling/clicking/locking/giving way/falling):  Giving way   Previous episodes: Yes  Previous treatments: PT after previous surgeries  PATIENTS NAME:  Jada Billings   : 1950  PRIMARY/TREATMENT DIAGNOSIS: (Pertinent Medical Diagnosis)  Hip pain, right    Specific Questions:  General health (excellent/good/fair/poor):  Good  Pertinent medical history includes: Overweight  Medications (nil/NSAIDS/analg/steroids/anticoag/other):  Other - Sleep  Medical allergies:  PCN  Imaging (none/Xray/MRI/other):  US  Recent or major surgery (yes/no):  (R) TAMMY and revision as stated above; (L) TAMMY 10 yrs ago, Lt partial knee replacement 1 yr ago; Lt hand; Rt shoulder  Night pain (yes/no):  No  Accidents (yes/no):  No  Unexplained weight loss (yes/no):  No  Barriers at home: None  Other red flags: None    Sites for physical examination (back/hip/knee/ankle/foot/other): back/hip    EXAMINATION    Posture:  Sitting (good/fair/poor): Fair    Correction of Posture (better/worse/no effect/NA): No effect  Standing (good/fair/poor):   Other observations:    Neurological: (NA/motor/sensory/reflexes/dural): NT  Baselines (pain or functional activity): Painful walking, first few steps from rising, stairs  Extremities (Hip / Knee / Ankle / Foot): Hip  Movement Loss Morales Mod Min Nil Pain   Flexion    X ERP   Extension    X    Abduction    X    Adduction    X    Internal Rotation    X    External Rotation    X ERP   Passive Movement (+/- over pressure)/(PDM/ERP):  WNL/ERP with flex and ER  Resisted Test Response (pain): Rt Hip Flex 4-/5; Hip Abd 4-/5; Hip Ext 4/5; Knee ext 5/5; Knee flex 5/5.  Other Tests: NT    Spine:  Movement loss: Flex WNL; Ext Min-Mod loss; SG WNL (B)  Effect of repeated movements: FISit - Produce ant hip, NW, NE ROM or pain with walking; EIS - NE, NE, Inc ROM (Ext), NE pain with walking  Effect of static positioning: NT  Spine testing (not  relevant/relevant/secondary problem): Secondary    PATIENTS NAME:  Jada Billings   : 1950  PRIMARY/TREATMENT DIAGNOSIS: (Pertinent Medical Diagnosis)  Hip pain, right    Baseline Symptoms: NA  Repeated Tests Symptom Response Mechanical Response   Active/Passive movement, resisted test, functional test During -  Produce, Abolish, Increase, Decrease, NE After -  Better, Worse, NB, NW, NE Effect -   ? or ? ROM, strength or key functional test No   Effect   NT       Effect of static positioning       NT       Provisional Classification (Extremity/Spine):  Extremity - Other - Post-Surgery    Princicple of Management:   Education:  Treatment plan and progression    Equipment provided:  None  Exercise and dosage:  Lumbar EIS x 10 reps, 2-3 x daily; Bridging, SLR (Flex and Abd) x 10 reps ea progressing towards 30 reps 1 x daily and strengthening progression at subsequent visits.    ASSESSMENT/PLAN:  Patient is a 67 year old female with right side hip complaints.    Patient has the following significant findings with corresponding treatment plan.                Diagnosis 1:  (R) Hip Pain  Pain -  self management, education, directional preference exercise and home program  Decreased ROM/flexibility - manual therapy, therapeutic exercise and home program  Decreased joint mobility - manual therapy, therapeutic exercise and home program  Decreased strength - therapeutic exercise, therapeutic activities and home program  Impaired gait - gait training and home program  Impaired muscle performance - neuro re-education and home program  Decreased function - therapeutic activities and home program                              PATIENTS NAME:  Jada Billings   : 1950  PRIMARY/TREATMENT DIAGNOSIS: (Pertinent Medical Diagnosis)  Hip pain, right    Therapy Evaluation Codes:   1) History comprised of:   Personal factors that impact the plan of care:      None.    Comorbidity factors that impact the plan of care  are:      Overweight.     Medications impacting care: Sleep.  2) Examination of Body Systems comprised of:   Body structures and functions that impact the plan of care:      Hip and Lumbar spine.   Activity limitations that impact the plan of care are:      Dressing, Squatting/kneeling, Stairs, Standing and Walking.  3) Clinical presentation characteristics are:   Evolving/Changing.  4) Decision-Making    Moderate complexity using standardized patient assessment instrument and/or   measureable assessment of functional outcome.  Cumulative Therapy Evaluation is: Moderate complexity.    Previous and current functional limitations:  (See Goal Flow Sheet for this information)    Short term and Long term goals: (See Goal Flow Sheet for this information)   Communication ability:  Patient appears to be able to clearly communicate and understand verbal and written communication and follow directions correctly.  Treatment Explanation - The following has been discussed with the patient:   RX ordered/plan of care, Anticipated outcomes, Possible risks and side effects                                                PATIENTS NAME:  Jada Billings   : 1950  PRIMARY/TREATMENT DIAGNOSIS: (Pertinent Medical Diagnosis)  Hip pain, right    This patient would benefit from PT intervention to resume normal activities.   Rehab potential is good.  Frequency:  1 X week, once daily  Duration:  for 4 weeks  Discharge Plan:  Achieve all LTG.  Independent in home treatment program.  Reach maximal therapeutic benefit.          Caregiver Signature/Credentials _____________________________ Date ________          Julian Genao DPT, Cert MDT     I have reviewed and certified the need for these services and plan of treatment while under my care.        PHYSICIAN'S SIGNATURE:   _________________________________________      Date___________   MELA Naqvi CNP    Certification period:  Beginning of Cert date period: 18 to   "09/08/18     Functional Level Progress Report: Please see attached \"Goal Flow sheet for Functional level.\"    ____X____ Continue Services or       ________ DC Services                Service dates: From  SOC Date: 06/11/18 to present                         "

## 2018-06-12 PROBLEM — M25.551 HIP PAIN, RIGHT: Status: ACTIVE | Noted: 2018-06-12

## 2018-06-20 ENCOUNTER — THERAPY VISIT (OUTPATIENT)
Dept: PHYSICAL THERAPY | Facility: CLINIC | Age: 68
End: 2018-06-20
Payer: MEDICARE

## 2018-06-20 DIAGNOSIS — M25.551 HIP PAIN, RIGHT: ICD-10-CM

## 2018-06-20 PROCEDURE — 97110 THERAPEUTIC EXERCISES: CPT | Mod: GP | Performed by: PHYSICAL THERAPIST

## 2018-06-27 ENCOUNTER — THERAPY VISIT (OUTPATIENT)
Dept: PHYSICAL THERAPY | Facility: CLINIC | Age: 68
End: 2018-06-27
Payer: MEDICARE

## 2018-06-27 DIAGNOSIS — M25.551 HIP PAIN, RIGHT: ICD-10-CM

## 2018-06-27 PROCEDURE — 97110 THERAPEUTIC EXERCISES: CPT | Mod: GP | Performed by: PHYSICAL THERAPIST

## 2018-06-27 PROCEDURE — 97112 NEUROMUSCULAR REEDUCATION: CPT | Mod: GP | Performed by: PHYSICAL THERAPIST

## 2018-07-17 ENCOUNTER — THERAPY VISIT (OUTPATIENT)
Dept: PHYSICAL THERAPY | Facility: CLINIC | Age: 68
End: 2018-07-17
Payer: MEDICARE

## 2018-07-17 DIAGNOSIS — M25.551 HIP PAIN, RIGHT: ICD-10-CM

## 2018-07-17 PROCEDURE — 97112 NEUROMUSCULAR REEDUCATION: CPT | Mod: GP | Performed by: PHYSICAL THERAPIST

## 2018-07-17 PROCEDURE — G8980 MOBILITY D/C STATUS: HCPCS | Mod: GP | Performed by: PHYSICAL THERAPIST

## 2018-07-17 PROCEDURE — G8979 MOBILITY GOAL STATUS: HCPCS | Mod: GP | Performed by: PHYSICAL THERAPIST

## 2018-07-17 PROCEDURE — 97110 THERAPEUTIC EXERCISES: CPT | Mod: GP | Performed by: PHYSICAL THERAPIST

## 2018-07-17 NOTE — PROGRESS NOTES
DISCHARGE REPORT    Progress reporting period is from 6.11.18 to 7.17.18.       SUBJECTIVE  Subjective changes noted by patient:  Pt reports hip pain is better.  Noticing less with standing/walking.  Has been doing HEP daily and able to increase reps with exercises.    Current Pain level: 3/10.     Initial Pain level: 6/10.   Changes in function:  Yes (See Goal flowsheet attached for changes in current functional level)  Adverse reaction to treatment or activity: None    OBJECTIVE  Objective: Strength:  Hip flex 5-/5; Quad 5/5; H-S 5/5; Hip Abd 4+/5.      ASSESSMENT/PLAN  Updated problem list and treatment plan:   Diagnosis 1:  (R) Hip Pain    Pain -  self management, education, directional preference exercise and home program  Decreased strength - therapeutic exercise, therapeutic activities and home program  Impaired muscle performance - neuro re-education and home program  Decreased function - therapeutic activities and home program  STG/LTGs have been met or progress has been made towards goals:  Yes (See Goal flow sheet completed today.)  Assessment of Progress: The patient's condition is improving.  Self Management Plans:  Patient is independent in a home treatment program.  Patient is independent in self management of symptoms.  I have re-evaluated this patient and find that the nature, scope, duration and intensity of the therapy is appropriate for the medical condition of the patient.  Jada continues to require the following intervention to meet STG and LTG's:  PT intervention is no longer required to meet STG/LTG.    Recommendations:  This patient is ready to be discharged from therapy and continue their home treatment program.

## 2023-04-24 ENCOUNTER — TELEPHONE (OUTPATIENT)
Dept: ORTHOPEDICS | Facility: CLINIC | Age: 73
End: 2023-04-24
Payer: COMMERCIAL

## 2023-04-24 NOTE — TELEPHONE ENCOUNTER
Patient called back and more information was gathered. She stated that it is her right knee and it started out in her calf and then moved up to the knee.  It started with a twisting pain in her calf, which she was ruled out for a DVT.  She does have a history of back pain and had surgery on her lumbar spine with an outside neurosurgery provider.  She did see them recently and they suggested a spine injection and stated that it cold help with her leg pain. She wanted to see someone for her knee/calf pain to make sure that there is nothing else going on. It was recommended to have her see a sports medicine provider. The  was on the call and able to get her scheduled.

## 2023-04-24 NOTE — TELEPHONE ENCOUNTER
Patient was called back and a message was left.  Depending on her leg that it bothering her she could start with sports to determine the issue.  If the right side she can certainly start with sports.  If it is her left side she could start with one of the total joint providers as she has has surgery on that knee before.  Unless it is more urgent she could start with sports.  If it is determined to be her back she could see on of the spine PAs for a work up or PMR.  Message was left to call back.     20-Feb-2021 20:52

## 2023-04-24 NOTE — TELEPHONE ENCOUNTER
[facility] Call Center    Phone Message    May a detailed message be left on voicemail: yes     Reason for Call: Other: Patient called in due to having pain in leg going on 1 month and non-weightbearing right now. She is not sure if it is ortho or not. Please advise.Call on cell phone, per patient.     Action Taken: Other: [id number]    Travel Screening: Not Applicable

## 2023-05-02 ENCOUNTER — OFFICE VISIT (OUTPATIENT)
Dept: ORTHOPEDICS | Facility: CLINIC | Age: 73
End: 2023-05-02
Payer: COMMERCIAL

## 2023-05-02 ENCOUNTER — ANCILLARY PROCEDURE (OUTPATIENT)
Dept: GENERAL RADIOLOGY | Facility: CLINIC | Age: 73
End: 2023-05-02
Attending: PEDIATRICS
Payer: COMMERCIAL

## 2023-05-02 VITALS
HEIGHT: 63 IN | WEIGHT: 207 LBS | SYSTOLIC BLOOD PRESSURE: 137 MMHG | DIASTOLIC BLOOD PRESSURE: 78 MMHG | BODY MASS INDEX: 36.68 KG/M2

## 2023-05-02 DIAGNOSIS — G89.29 CHRONIC PAIN OF RIGHT KNEE: ICD-10-CM

## 2023-05-02 DIAGNOSIS — M25.561 ACUTE PAIN OF RIGHT KNEE: Primary | ICD-10-CM

## 2023-05-02 DIAGNOSIS — M25.561 CHRONIC PAIN OF RIGHT KNEE: ICD-10-CM

## 2023-05-02 DIAGNOSIS — M17.11 PRIMARY OSTEOARTHRITIS OF RIGHT KNEE: ICD-10-CM

## 2023-05-02 PROCEDURE — 73562 X-RAY EXAM OF KNEE 3: CPT | Mod: TC | Performed by: RADIOLOGY

## 2023-05-02 PROCEDURE — 99203 OFFICE O/P NEW LOW 30 MIN: CPT | Performed by: PEDIATRICS

## 2023-05-02 NOTE — PATIENT INSTRUCTIONS
X-rays of the right knee today show mild underlying degenerative change in the medial compartment, with mild joint space narrowing.  Thus, overall appearance is most consistent with some underlying osteoarthritis of the right knee.  We also discussed potential for some component of pes anserine bursitis, or inflammation at the medial knee.  For next steps, we discussed considerations around additional imaging with MRI, rehab approach with exercises, potential use of compression or support (may include  bracing, trial through physical therapy), use medications, potential for injection, and referral on to see orthopedic surgery for further discussion.  Following discussion, plan to get you set up with one of my colleagues for use of ultrasound guidance for an injection.  In the meantime, plan to submit prior authorization for Visco supplement injection.  If this is approved, you would have a choice of injection at the time.  If Visco is not approved, then can proceed with imaging guided steroid injection for the knee.  You can work on home exercises in the meantime, reviewed today.  If interested in physical therapy, or interested in  brace trial, contact clinic and we can place referral to PT.

## 2023-05-02 NOTE — Clinical Note
5/2/2023         RE: Jada Billings  [address]  [facility] [address]        Dear Colleague,    Thank you for referring your patient, Jada Billings, to the [facility] [place]. Please see a copy of my visit note below.    ASSESSMENT & PLAN    Jada was seen today for pain.    Diagnoses and all orders for this visit:    Acute pain of right knee  -     XR Knee Standing AP [place] Bilat Lat Right; Future    Primary osteoarthritis of right knee  -     (PRE-AUTH REQUEST) 48 mg hylan (SYNVISC ONE) injection 48 mg/6mL-ONCE            See Patient Instructions  Patient Instructions   X-rays of the right knee today show mild underlying degenerative change in the medial compartment, with mild joint space narrowing.  Thus, overall appearance is most consistent with some underlying osteoarthritis of the right knee.  We also discussed potential for some component of pes anserine bursitis, or inflammation at the medial knee.  For next steps, we discussed considerations around additional imaging with MRI, rehab approach with exercises, potential use of compression or support (may include  bracing, trial through physical therapy), use medications, potential for injection, and referral on to see orthopedic surgery for further discussion.  Following discussion, plan to get you set up with one of my colleagues for use of ultrasound guidance for an injection.  In the meantime, plan to submit prior authorization for Visco supplement injection.  If this is approved, you would have a choice of injection at the time.  If Visco is not approved, then can proceed with imaging guided steroid injection for the knee.  You can work on home exercises in the meantime, reviewed today.  If interested in physical therapy, or interested in  brace trial, contact clinic and we can place referral to PTMaribeth Emerson,   [facility]  "[place]    -----  Chief Complaint   Patient presents with     Right Knee - Pain       SUBJECTIVE  Jada Billings is a/an 72 year old female who is seen as a self referral for evaluation of Right Leg Pain.     The patient is seen by themselves.      Onset: 1 month(s) ago. Reports insidious onset without acute precipitating event.  Location of Pain: right knee, patellar tendon and medially, describes a stabbing pain at night and that it is difficult to get comfortable. Reports that pain has gotten better, going from a walker to being able to walk. Recently had a spinal injection at [facility] last Tuesday 4/25/23  Worsened by: walking  Better with: Tylenol and Ibuprofen take the edge off  Treatments tried: ice, heat, Tylenol, ibuprofen and compression  Associated symptoms: numbness and tingling and reports some swelling    Orthopedic/Surgical history: YES - Left Knee hemiarthroplasty Date: 4/13/2017  Social History/Occupation: Retired, trains service dogs    **  Pain anterior knee, anteromedial knee.  Recalls onset with simply getting up and getting moving one morning. Initially noted cramping type sensation in right calf. Tried some exercise, but that worsened pain. Then noted some swelling posterior right knee. Now pain is more in anteromedial knee. Has noted nighttime symptoms also.  No longer with calf pain.  Has noted some relief with right LE pain after NICK.        REVIEW OF SYSTEMS:  Review of Systems      OBJECTIVE:  /78   Ht 1.6 m (5' 3\")   Wt 93.9 kg (207 lb)   BMI 36.67 kg/m             Right Knee exam    Inspection:   + effusion      ROM:      Flexion ~100 degrees, stiffness       Extension grossly intact, mild anterior pain and some cracking       Range of motion limited by stiffness    Patellar Motion:      Crepitus noted in the patellofemoral joint    Tender:      medial joint line       pes anserine bursa    Special Tests:      neg (-) varus       neg (-) valgus       "     RADIOLOGY:  I independently ordered, visualized and reviewed these images with the patient  Right knee medial compartment narrowing. Left knee hemiarthroplasty.      XR Knee Standing AP [place] Bilat Lat Right    Narrative    XR KNEE STANDING AP [place] BILAT LAT RIGHT   5/2/2023 2:34 PM     HISTORY: Chronic pain of right knee; Chronic pain of right knee  COMPARISON:      Impression    IMPRESSION:     RIGHT KNEE: No acute fracture or dislocation. No sizable joint  effusion. There is mild medial compartment joint space narrowing,  likely degenerative.     LEFT KNEE: Frontal and sunrise views of the left knee demonstrate a  medial compartment hemiarthroplasty with redemonstrated tilting of the  femoral component which is incompletely evaluated. There is mild  patellofemoral degenerative arthrosis.    MIMI QURESHI MD         SYSTEM ID:  [id number]                 Again, thank you for allowing me to participate in the care of your patient.        Sincerely,        Daren Emerson DO

## 2023-05-02 NOTE — PROGRESS NOTES
ASSESSMENT & PLAN    Jada was seen today for pain.    Diagnoses and all orders for this visit:    Acute pain of right knee  -     XR Knee Standing AP [place] Bilat Lat Right; Future    Primary osteoarthritis of right knee  -     (PRE-AUTH REQUEST) 48 mg hylan (SYNVISC ONE) injection 48 mg/6mL-ONCE            See Patient Instructions  Patient Instructions   X-rays of the right knee today show mild underlying degenerative change in the medial compartment, with mild joint space narrowing.  Thus, overall appearance is most consistent with some underlying osteoarthritis of the right knee.  We also discussed potential for some component of pes anserine bursitis, or inflammation at the medial knee.  For next steps, we discussed considerations around additional imaging with MRI, rehab approach with exercises, potential use of compression or support (may include  bracing, trial through physical therapy), use medications, potential for injection, and referral on to see orthopedic surgery for further discussion.  Following discussion, plan to get you set up with one of my colleagues for use of ultrasound guidance for an injection.  In the meantime, plan to submit prior authorization for Visco supplement injection.  If this is approved, you would have a choice of injection at the time.  If Visco is not approved, then can proceed with imaging guided steroid injection for the knee.  You can work on home exercises in the meantime, reviewed today.  If interested in physical therapy, or interested in  brace trial, contact clinic and we can place referral to PT.      Daren Emerson [facility] [place]    -----  Chief Complaint   Patient presents with     Right Knee - Pain       SUBJECTIVE  Jada Billings is a/an 72 year old female who is seen as a self referral for evaluation of Right Leg Pain.     The patient is seen by themselves.      Onset: 1 month(s) ago. Reports  "insidious onset without acute precipitating event.  Location of Pain: right knee, patellar tendon and medially, describes a stabbing pain at night and that it is difficult to get comfortable. Reports that pain has gotten better, going from a walker to being able to walk. Recently had a spinal injection at [facility] last Tuesday 4/25/23  Worsened by: walking  Better with: Tylenol and Ibuprofen take the edge off  Treatments tried: ice, heat, Tylenol, ibuprofen and compression  Associated symptoms: numbness and tingling and reports some swelling    Orthopedic/Surgical history: YES - Left Knee hemiarthroplasty Date: 4/13/2017  Social History/Occupation: Retired, trains service dogs    **  Pain anterior knee, anteromedial knee.  Recalls onset with simply getting up and getting moving one morning. Initially noted cramping type sensation in right calf. Tried some exercise, but that worsened pain. Then noted some swelling posterior right knee. Now pain is more in anteromedial knee. Has noted nighttime symptoms also.  No longer with calf pain.  Has noted some relief with right LE pain after NICK.        REVIEW OF SYSTEMS:  Review of Systems      OBJECTIVE:  /78   Ht 1.6 m (5' 3\")   Wt 93.9 kg (207 lb)   BMI 36.67 kg/m             Right Knee exam    Inspection:   + effusion      ROM:      Flexion ~100 degrees, stiffness       Extension grossly intact, mild anterior pain and some cracking       Range of motion limited by stiffness    Patellar Motion:      Crepitus noted in the patellofemoral joint    Tender:      medial joint line       pes anserine bursa    Special Tests:      neg (-) varus       neg (-) valgus           RADIOLOGY:  I independently ordered, visualized and reviewed these images with the patient  Right knee medial compartment narrowing. Left knee hemiarthroplasty.      XR Knee Standing AP [place] Bilat Lat Right    Narrative    XR KNEE STANDING AP [place] BILAT LAT RIGHT   5/2/2023 2:34 PM "     HISTORY: Chronic pain of right knee; Chronic pain of right knee  COMPARISON:      Impression    IMPRESSION:     RIGHT KNEE: No acute fracture or dislocation. No sizable joint  effusion. There is mild medial compartment joint space narrowing,  likely degenerative.     LEFT KNEE: Frontal and sunrise views of the left knee demonstrate a  medial compartment hemiarthroplasty with redemonstrated tilting of the  femoral component which is incompletely evaluated. There is mild  patellofemoral degenerative arthrosis.    MIMI QURESHI MD         SYSTEM ID:  [id number]

## 2023-05-18 ENCOUNTER — OFFICE VISIT (OUTPATIENT)
Dept: ORTHOPEDICS | Facility: CLINIC | Age: 73
End: 2023-05-18
Payer: COMMERCIAL

## 2023-05-18 DIAGNOSIS — M17.11 PRIMARY OSTEOARTHRITIS OF RIGHT KNEE: Primary | ICD-10-CM

## 2023-05-18 PROCEDURE — 20611 DRAIN/INJ JOINT/BURSA W/US: CPT | Mod: RT | Performed by: FAMILY MEDICINE

## 2023-05-18 NOTE — PATIENT INSTRUCTIONS
[facility] Injection Discharge Instructions    Procedure: Right knee aspiration/Synvisc injection    You may shower, however avoid swimming, tub baths or hot tubs for 24 hours following your procedure  You may have a mild to moderate increase in pain for several days following the injection.  It may take up to 30 days for the medication to start working although you may feel the effect as early as a few days after the procedure.  You may use ice packs for 10-15 minutes, 3 to 4 times a day at the injection site for comfort  You may use anti-inflammatory medications (such as Ibuprofen or Aleve or Advil) or Tylenol for pain control if necessary    If you experience any of the following, call [facility] @ [phone] or [phone]  -Fever over 100 degree F  -Swelling, bleeding, redness, drainage, warmth at the injection site  - New or worsening pain    It was great seeing you today!    Richie Abreu

## 2023-05-18 NOTE — PROGRESS NOTES
Large Joint Injection/Arthocentesis: R knee joint    Date/Time: 5/18/2023 8:42 AM    Performed by: Richie Abreu MD  Authorized by: Richie Abreu MD    Indications:  Pain and osteoarthritis  Needle Size:  21 G  Guidance: ultrasound    Approach:  Superolateral  Location:  Knee      Medications:  48 mg hylan 48 MG/6ML  Aspirate amount (mL):  9  Aspirate:  Serous and yellow  Outcome:  Tolerated well, no immediate complications  Procedure discussed: discussed risks, benefits, and alternatives    Consent Given by:  Patient  Timeout: timeout called immediately prior to procedure    Prep: patient was prepped and draped in usual sterile fashion     Ultrasound images of procedure were permanently stored.   Referred by Dr. Emerson     Patient tolerated right knee joint aspiration/hyaluronic acid injection today.  Aftercare instructions given to patient.  Plan to follow-up as previously discussed with referring provider.  Ultrasound guided images were permanently stored.     Richie Abreu MD [facility]

## 2023-05-18 NOTE — LETTER
5/18/2023         RE: Jada Billings  [address]  [facility] [address]        Dear Colleague,    Thank you for referring your patient, Jada Billings, to the [facility] [place]. Please see a copy of my visit note below.    Large Joint Injection/Arthocentesis: R knee joint    Date/Time: 5/18/2023 8:42 AM    Performed by: Richie Abreu MD  Authorized by: Richie Abreu MD    Indications:  Pain and osteoarthritis  Needle Size:  21 G  Guidance: ultrasound    Approach:  Superolateral  Location:  Knee      Medications:  48 mg hylan 48 MG/6ML  Aspirate amount (mL):  9  Aspirate:  Serous and yellow  Outcome:  Tolerated well, no immediate complications  Procedure discussed: discussed risks, benefits, and alternatives    Consent Given by:  Patient  Timeout: timeout called immediately prior to procedure    Prep: patient was prepped and draped in usual sterile fashion     Ultrasound images of procedure were permanently stored.   Referred by Dr. Emerson     Patient tolerated right knee joint aspiration/hyaluronic acid injection today.  Aftercare instructions given to patient.  Plan to follow-up as previously discussed with referring provider.  Ultrasound guided images were permanently stored.     Richie Abreu MD [facility]              Again, thank you for allowing me to participate in the care of your patient.        Sincerely,        Richie Abreu MD

## 2023-06-24 ENCOUNTER — HEALTH MAINTENANCE LETTER (OUTPATIENT)
Age: 73
End: 2023-06-24

## 2024-03-18 NOTE — H&P (VIEW-ONLY)
[facility]      Preoperative Consultation   Jada Billings   : 1950   Gender: female    Date of Encounter: 3/18/2024    Nursing Notes:   Val Pina  3/18/2024 10:36 AM  Signed  Jada Billings is a 73 y.o. female (1950) who presents for preop evaluation undergoing surgery for treatment of CYSTOSCOPY, RIGHT RETROGRADE PYELOGRAM, RIGHT URETEROSCOPY AND LASER LITHOTRIPSY WITH BASKET EXTRACTION OF FRAGMENTS, POSSIBLE RIGHT STENT PLACEMENT.    Date of Surgery: 2024  Surgical Specialty:  Reva Aleman MD  Hospital/Surgical Facility:  [facility]  Fax number:   Surgery type: outpatient  Chief Complaint   Patient presents with     Preoperative Exam     DOS 24,CYSTOSCOPY, RIGHT RETROGRADE PYELOGRAM, RIGHT URETEROSCOPY AND LASER LITHOTRIPSY WITH BASKET EXTRACTION OF FRAGMENTS, POSSIBLE RIGHT STENT PLACEMENT , [facility],Reva Tirado MD       Additional visit information (chief complaint/health maintenance) shared by patient: No    Health Maintenance Due   Topic Date Due     COVID-19 vaccine series ( season) 2023       Health maintenance reviewed with patient Yes    Patient presents for an in-person office visit: alone  Communication Method: Patient is active on [employer] and has been instructed that results/communications will be made via [employer]  If a phone call is needed, the preferred number is:  Mobile   Home Phone [phone]   Mobile [phone]     May we leave a detailed message at this number? Yes      Val Pina MA 3/18/2024 10:36 AM      Primary Physician: Hillary Mcgarry       History of Present Illness   Hematuria ongoing, CT scan shows kidney stone on the R.  Plan for removal tomorrow.  She has stopped her fish oil and diclofenac as per her last surgery in January.  Hx of having antibiotics before dental procedures, no heart history.    She does note a cough that started a few days ago, not productive, cough  at night makes her hoarse during the day.  + headache frontal.  A little runny nose.  No fevers, no body aches.  Taking Tylenol to help.  No known exposures, no home COVID-19 test.     Review of Systems   A comprehensive review of systems was negative except for items noted in HPI.    Patient Active Problem List   Diagnosis Code     LOW BACK PAIN M54.50     POSTMENOPAUSAL HORMONE REPLACEMENT THERAPY Z79.890     Persistent disorder of initiating or maintaining sleep G47.00     Cervicalgia M54.2     Headache(784.0) R51.9     Dysthymic disorder F34.1     Dry eye syndrome H04.129     Overweight E66.3     GERD (gastroesophageal reflux disease) K21.9     OA (osteoarthritis) M19.90     Insomnia G47.00     Depression F32.A     Endometriosis N80.9     Lumbar back pain with radiculopathy affecting right lower extremity M54.16     Depression with anxiety F41.8     Hyperlipidemia E78.5     Stage 3a chronic kidney disease (HC) N18.31     Depression, recurrent (HC) F33.9     History of total left hip arthroplasty Z96.642     Foreign body (FB) in soft tissue M79.5     Current Outpatient Medications   Medication Sig     acetaminophen (TYLENOL EXTRA STRGTH) 500 mg tablet Take 2 Tablets (1,000 mg) by mouth every 6 hours if needed for Pain. Max acetaminophen dose: 4000mg in 24 hrs.     Apple Cider Vinegar 500 mg tab Take 1 Capsule by mouth.     artificial tears opthalmic ointment, 80%-20%, (Retaine PM) Apply 1 Strip to both eyes at bedtime.     artificial tears, peg 400-propylene glycol, (Systane) 0.4-0.3 % ophthalmic dropperette Place 1-2 Drops into both eyes 4 times daily if needed for Dry Eyes.     diclofenac (VOLTAREN) 75 mg delayed-release tablet Take 0.5 Tablets (37.5 mg) by mouth once daily. Take with a meal for chronic pain.     diclofenac topical (VOLTAREN) 1 % gel APPLY 4 GRAMS TO AFFECTED AREA(s) OF SKIN FOUR TIMES DAILY     DULoxetine (CYMBALTA) 30 mg Delayed-release capsule Take 2 Capsules (60 mg) by mouth once daily. may  take 1 additional capsule at night, if needed     ibuprofen (ADVIL; MOTRIN) 600 mg tablet Take 1 Tablet (600 mg) by mouth every 6 hours if needed for Pain. Maximum of 3200 mg in 24 hours.     medication order composer Calcium Gummy:Take 1 gummy once daily     QUEtiapine (SEROQUEL) 100 mg tablet Take 1-2 Tablets (100-200 mg) by mouth at bedtime. For anxiety & sleep.     simvastatin (ZOCOR) 40 mg tablet Take 1 Tablet (40 mg) by mouth once daily in the evening.     terbinafine 1% cream (LamISIL AT) 1 % cream Apply topically to affected area(s) two times daily.     vit C-vit E-lutein-minerals-omega (Ocuvite Adult 50 Plus) 250-5-1 mg Take 1 Capsule by mouth once daily.     No current facility-administered medications for this visit.     Medications have been reviewed by me and are current to the best of my knowledge and ability.     Allergies   Allergen Reactions     Penicillins Rash                                                                                                                  Anesthetics - Amide Type - Select Amino Amides Itching and Urinary Retention     Augmentin [Amoxicillin-Pot Clavulanate] Diarrhea     Age 45-50.      Past Surgical History:   . Laterality Date     APPENDECTOMY       BREAST BIOPSY      left     COLONOSCOPY SCREENING  12/2005    Dr Mathis  no polyps, diverticuli  repeat in 10 yrs     HIP REPLACEMENT Right 2014     History of hip arthro Left 2005     Infertility surgery      lap surgery     KNEE REPLACEMENT Left 04/14/2017    partial knee replacement     left hand surgery  07/17/2007    2 broken bones     left hip orthoscopy  10/2006    resurfacing twice      orthoscopic surgery left knee  02/2016     REMOVAL FOREIGN BODY OF FOOT Left 01/04/2024     right hip orthoscopy  04/21/2008    resurfacing - [place]     ROTATOR CUFF REPAIR Right 04/2013     SPINAL FUSION  2020     Social History     Tobacco Use     Smoking status: Never     Smokeless tobacco: Never     Tobacco comments:      never   Vaping Use     Vaping status: Never Used   Substance Use Topics     Alcohol use: Not Currently     Comment: Very rare     Drug use: No     Family History   Problem Relation Age of Onset     Hypertension Mother          age 99 old age, had COPD     Heart Disease Mother      Osteoporosis Mother      Other Mother         vericose veins/blood clot/breast lump/Glaucoma     Hyperlipidemia Mother      Stroke Father          age 70 had cva and pneumonia     Other Sister         varicose veins     Hypertension Sister      Osteoporosis Maternal Grandmother      Heart Disease Maternal Grandmother      Stomach cancer Maternal Grandfather         stomach     Hypertension Other      Cancer-breast No Family History      Cancer-ovarian No Family History      Cancer-colon No Family History        PAST DIFFICULTY WITH ANESTHESIA: None     Physical Exam   /68 (Cuff Site: Right Arm, Position: Sitting, Cuff Size: Adult Large Long)   Pulse 84   Wt 90.2 kg (198 lb 12.8 oz)   LMP 2000   SpO2 94%   Breastfeeding No   BMI 34.15 kg/m   Body mass index is 34.15 kg/m .    General Appearance: Pleasant, alert, appropriate appearance for age. No acute distress  Head Exam: Normocephalic, without obvious abnormality.  Eye Exam: Normal external eye, conjunctiva, lids, cornea.   Ear Exam: Normal TM's bilaterally. Normal auditory canals and external ears. Non-tender.  Nose Exam: Normal external nose, mucous membranes pale, and septum midline.  OroPharynx Exam: Dental hygiene adequate. Normal buccal mucosa. Normal pharynx.  Thyroid Exam: No nodules or enlargement.  Chest/Respiratory Exam: Normal chest wall and respirations. Clear to auscultation.  Cardiovascular Exam: Regular rate and rhythm. S1, S2, no murmur, click, gallop, or rubs.  No lower leg swelling  Musculoskeletal Exam: full ROM of upper and lower extremities.  Skin: no rash or abnormalities  Neurologic Exam: Nonfocal; normal gross motor movement, tone, and  "coordination. No tremor.  Psychiatric Exam: Alert and oriented, appropriate affect.      Assessment / Plan     The Pre-Op Tool    Recommendations      Low Risk Procedure    Cardiac History  No history of coronary artery disease           Labs  Potassium within last 30 days  Creatinine within last 30 days  EKG  Not indicated  Stress Testing  Not indicated    * Testing recommendations are intended to assist, but not direct, clinical decisions.           Hold Diclofenac (Voltaren) for 2 days prior to the procedure.  Hold fish oil for 2 weeks prior to the procedure  Okay to take Acetaminophen (Tylenol) up until the procedure    * Medication recommendations are not intended to be exhaustive; they are limited to common medications that are potentially dangerous if incorrectly managed     Labs:   CREATININE (mg/dL)   Date Value   03/18/2024 1.29 (H)         POTASSIUM (mmol/L)   Date Value   03/18/2024 4.0         ECG: not indicated, normal EKG 08/2023      ICD-10-CM    1. Preop examination  Z01.818 BASIC METABOLIC PANEL      2. Kidney stone  N20.0       3. Stage 3a chronic kidney disease (HC)  N18.31 BASIC METABOLIC PANEL      4. Acute cough  R05.1 COVID-19 MOLECULAR        Patient is positive for COVID-19 today, symptomatic but vitals are stable.  This result was received in the evening.  Per preop tool, \"According to American Society of Anesthesiologists the suggested wait times from the date of COVID-19 diagnosis to surgery are as follows:    ?  Four weeks for an asymptomatic patient or recovery from only mild, non-respiratory symptoms.  ?  Six weeks for a symptomatic patient (e.g., cough, dyspnea) who did not require hospitalization.\"    Based on those guidelines, patient is not cleared for surgery.  Creatinine today appears to be within her baseline range.    No preop antibiotics indicated.      Patient advised via [employer] message evening of 03/18 to contact surgeon's office ASAP about rescheduling.    Electronically " Signed by:   Anita Shanks NP ....................  3/18/2024

## 2024-03-26 ENCOUNTER — HOSPITAL ENCOUNTER (OUTPATIENT)
Facility: HOSPITAL | Age: 74
End: 2024-03-26
Attending: STUDENT IN AN ORGANIZED HEALTH CARE EDUCATION/TRAINING PROGRAM | Admitting: STUDENT IN AN ORGANIZED HEALTH CARE EDUCATION/TRAINING PROGRAM
Payer: COMMERCIAL

## 2024-03-26 DIAGNOSIS — N20.0 KIDNEY STONE: Primary | ICD-10-CM

## 2024-03-29 RX ORDER — PRENATAL VIT 91/IRON/FOLIC/DHA 28-975-200
COMBINATION PACKAGE (EA) ORAL 2 TIMES DAILY
COMMUNITY
End: 2024-04-01 | Stop reason: HOSPADM

## 2024-03-29 RX ORDER — QUETIAPINE FUMARATE 100 MG/1
100-200 TABLET, FILM COATED ORAL AT BEDTIME
COMMUNITY
End: 2024-04-01 | Stop reason: HOSPADM

## 2024-03-29 RX ORDER — TETRACYCLINE HCL 500 MG
1 CAPSULE ORAL
COMMUNITY
End: 2024-04-01 | Stop reason: HOSPADM

## 2024-03-31 RX ORDER — ACETAMINOPHEN 325 MG/1
975 TABLET ORAL ONCE
Status: CANCELLED | OUTPATIENT
Start: 2024-03-31 | End: 2024-03-31

## 2024-03-31 RX ORDER — CEFAZOLIN SODIUM/WATER 2 G/20 ML
2 SYRINGE (ML) INTRAVENOUS
Status: CANCELLED | OUTPATIENT
Start: 2024-03-31

## 2024-03-31 RX ORDER — SULFAMETHOXAZOLE/TRIMETHOPRIM 800-160 MG
1 TABLET ORAL 2 TIMES DAILY
Qty: 6 TABLET | Refills: 0 | Status: CANCELLED | OUTPATIENT
Start: 2024-03-31 | End: 2024-04-03

## 2024-04-01 ENCOUNTER — ANESTHESIA EVENT (OUTPATIENT)
Dept: SURGERY | Facility: HOSPITAL | Age: 74
End: 2024-04-01
Payer: COMMERCIAL

## 2024-04-01 ENCOUNTER — APPOINTMENT (OUTPATIENT)
Dept: RADIOLOGY | Facility: HOSPITAL | Age: 74
End: 2024-04-01
Attending: STUDENT IN AN ORGANIZED HEALTH CARE EDUCATION/TRAINING PROGRAM
Payer: COMMERCIAL

## 2024-04-01 ENCOUNTER — ANESTHESIA (OUTPATIENT)
Dept: SURGERY | Facility: HOSPITAL | Age: 74
End: 2024-04-01
Payer: COMMERCIAL

## 2024-04-01 ENCOUNTER — HOSPITAL ENCOUNTER (OUTPATIENT)
Facility: HOSPITAL | Age: 74
Discharge: HOME | End: 2024-04-01
Attending: STUDENT IN AN ORGANIZED HEALTH CARE EDUCATION/TRAINING PROGRAM | Admitting: STUDENT IN AN ORGANIZED HEALTH CARE EDUCATION/TRAINING PROGRAM
Payer: COMMERCIAL

## 2024-04-01 VITALS
DIASTOLIC BLOOD PRESSURE: 71 MMHG | BODY MASS INDEX: 33.22 KG/M2 | SYSTOLIC BLOOD PRESSURE: 128 MMHG | HEART RATE: 65 BPM | WEIGHT: 199.38 LBS | HEIGHT: 65 IN | RESPIRATION RATE: 20 BRPM | TEMPERATURE: 97.1 F | OXYGEN SATURATION: 98 %

## 2024-04-01 DIAGNOSIS — N20.0 KIDNEY STONES: Primary | ICD-10-CM

## 2024-04-01 PROCEDURE — 710N000009 HC RECOVERY PHASE 1, LEVEL 1, PER MIN: Performed by: STUDENT IN AN ORGANIZED HEALTH CARE EDUCATION/TRAINING PROGRAM

## 2024-04-01 PROCEDURE — 710N000012 HC RECOVERY PHASE 2, PER MINUTE: Performed by: STUDENT IN AN ORGANIZED HEALTH CARE EDUCATION/TRAINING PROGRAM

## 2024-04-01 PROCEDURE — 250N000011 HC RX IP 250 OP 636: Performed by: NURSE ANESTHETIST, CERTIFIED REGISTERED

## 2024-04-01 PROCEDURE — C1894 INTRO/SHEATH, NON-LASER: HCPCS | Performed by: STUDENT IN AN ORGANIZED HEALTH CARE EDUCATION/TRAINING PROGRAM

## 2024-04-01 PROCEDURE — 82365 CALCULUS SPECTROSCOPY: CPT | Performed by: STUDENT IN AN ORGANIZED HEALTH CARE EDUCATION/TRAINING PROGRAM

## 2024-04-01 PROCEDURE — 258N000003 HC RX IP 258 OP 636: Performed by: NURSE ANESTHETIST, CERTIFIED REGISTERED

## 2024-04-01 PROCEDURE — 258N000001 HC RX 258: Performed by: STUDENT IN AN ORGANIZED HEALTH CARE EDUCATION/TRAINING PROGRAM

## 2024-04-01 PROCEDURE — 370N000017 HC ANESTHESIA TECHNICAL FEE, PER MIN: Performed by: STUDENT IN AN ORGANIZED HEALTH CARE EDUCATION/TRAINING PROGRAM

## 2024-04-01 PROCEDURE — 250N000013 HC RX MED GY IP 250 OP 250 PS 637: Performed by: ANESTHESIOLOGY

## 2024-04-01 PROCEDURE — 360N000084 HC SURGERY LEVEL 4 W/ FLUORO, PER MIN: Performed by: STUDENT IN AN ORGANIZED HEALTH CARE EDUCATION/TRAINING PROGRAM

## 2024-04-01 PROCEDURE — 258N000003 HC RX IP 258 OP 636: Performed by: ANESTHESIOLOGY

## 2024-04-01 PROCEDURE — 250N000025 HC SEVOFLURANE, PER MIN: Performed by: STUDENT IN AN ORGANIZED HEALTH CARE EDUCATION/TRAINING PROGRAM

## 2024-04-01 PROCEDURE — 272N000001 HC OR GENERAL SUPPLY STERILE: Performed by: STUDENT IN AN ORGANIZED HEALTH CARE EDUCATION/TRAINING PROGRAM

## 2024-04-01 PROCEDURE — 250N000009 HC RX 250: Performed by: NURSE ANESTHETIST, CERTIFIED REGISTERED

## 2024-04-01 PROCEDURE — 999N000180 XR SURGERY CARM FLUORO LESS THAN 5 MIN: Mod: TC

## 2024-04-01 PROCEDURE — 999N000141 HC STATISTIC PRE-PROCEDURE NURSING ASSESSMENT: Performed by: STUDENT IN AN ORGANIZED HEALTH CARE EDUCATION/TRAINING PROGRAM

## 2024-04-01 PROCEDURE — C2617 STENT, NON-COR, TEM W/O DEL: HCPCS | Performed by: STUDENT IN AN ORGANIZED HEALTH CARE EDUCATION/TRAINING PROGRAM

## 2024-04-01 PROCEDURE — C1769 GUIDE WIRE: HCPCS | Performed by: STUDENT IN AN ORGANIZED HEALTH CARE EDUCATION/TRAINING PROGRAM

## 2024-04-01 PROCEDURE — 255N000002 HC RX 255 OP 636: Performed by: STUDENT IN AN ORGANIZED HEALTH CARE EDUCATION/TRAINING PROGRAM

## 2024-04-01 PROCEDURE — 250N000009 HC RX 250: Performed by: ANESTHESIOLOGY

## 2024-04-01 DEVICE — URETERAL STENT
Type: IMPLANTABLE DEVICE | Site: URETER | Status: FUNCTIONAL
Brand: PERCUFLEX™ PLUS

## 2024-04-01 RX ORDER — HYDROMORPHONE HYDROCHLORIDE 1 MG/ML
0.4 INJECTION, SOLUTION INTRAMUSCULAR; INTRAVENOUS; SUBCUTANEOUS EVERY 5 MIN PRN
Status: DISCONTINUED | OUTPATIENT
Start: 2024-04-01 | End: 2024-04-01 | Stop reason: HOSPADM

## 2024-04-01 RX ORDER — SULFAMETHOXAZOLE/TRIMETHOPRIM 800-160 MG
1 TABLET ORAL 2 TIMES DAILY
Qty: 6 TABLET | Refills: 0 | Status: SHIPPED | OUTPATIENT
Start: 2024-04-01 | End: 2024-04-04

## 2024-04-01 RX ORDER — NALOXONE HYDROCHLORIDE 0.4 MG/ML
0.1 INJECTION, SOLUTION INTRAMUSCULAR; INTRAVENOUS; SUBCUTANEOUS
Status: DISCONTINUED | OUTPATIENT
Start: 2024-04-01 | End: 2024-04-01 | Stop reason: HOSPADM

## 2024-04-01 RX ORDER — ONDANSETRON 2 MG/ML
4 INJECTION INTRAMUSCULAR; INTRAVENOUS EVERY 30 MIN PRN
Status: DISCONTINUED | OUTPATIENT
Start: 2024-04-01 | End: 2024-04-01 | Stop reason: HOSPADM

## 2024-04-01 RX ORDER — ONDANSETRON 4 MG/1
4 TABLET, ORALLY DISINTEGRATING ORAL EVERY 30 MIN PRN
Status: DISCONTINUED | OUTPATIENT
Start: 2024-04-01 | End: 2024-04-01 | Stop reason: HOSPADM

## 2024-04-01 RX ORDER — EPHEDRINE SULFATE 50 MG/ML
INJECTION, SOLUTION INTRAMUSCULAR; INTRAVENOUS; SUBCUTANEOUS PRN
Status: DISCONTINUED | OUTPATIENT
Start: 2024-04-01 | End: 2024-04-01

## 2024-04-01 RX ORDER — LIDOCAINE 40 MG/G
CREAM TOPICAL
Status: DISCONTINUED | OUTPATIENT
Start: 2024-04-01 | End: 2024-04-01 | Stop reason: HOSPADM

## 2024-04-01 RX ORDER — FENTANYL CITRATE 50 UG/ML
50 INJECTION, SOLUTION INTRAMUSCULAR; INTRAVENOUS EVERY 5 MIN PRN
Status: DISCONTINUED | OUTPATIENT
Start: 2024-04-01 | End: 2024-04-01 | Stop reason: HOSPADM

## 2024-04-01 RX ORDER — ACETAMINOPHEN 325 MG/1
975 TABLET ORAL ONCE
Status: DISCONTINUED | OUTPATIENT
Start: 2024-04-01 | End: 2024-04-01

## 2024-04-01 RX ORDER — ACETAMINOPHEN 325 MG/1
975 TABLET ORAL ONCE
Status: COMPLETED | OUTPATIENT
Start: 2024-04-01 | End: 2024-04-01

## 2024-04-01 RX ORDER — FENTANYL CITRATE 50 UG/ML
25 INJECTION, SOLUTION INTRAMUSCULAR; INTRAVENOUS EVERY 5 MIN PRN
Status: DISCONTINUED | OUTPATIENT
Start: 2024-04-01 | End: 2024-04-01 | Stop reason: HOSPADM

## 2024-04-01 RX ORDER — HYDROMORPHONE HYDROCHLORIDE 1 MG/ML
0.2 INJECTION, SOLUTION INTRAMUSCULAR; INTRAVENOUS; SUBCUTANEOUS EVERY 5 MIN PRN
Status: DISCONTINUED | OUTPATIENT
Start: 2024-04-01 | End: 2024-04-01 | Stop reason: HOSPADM

## 2024-04-01 RX ORDER — DEXAMETHASONE SODIUM PHOSPHATE 10 MG/ML
INJECTION, SOLUTION INTRAMUSCULAR; INTRAVENOUS PRN
Status: DISCONTINUED | OUTPATIENT
Start: 2024-04-01 | End: 2024-04-01

## 2024-04-01 RX ORDER — OXYCODONE HCL 5 MG/5 ML
10 SOLUTION, ORAL ORAL
Status: DISCONTINUED | OUTPATIENT
Start: 2024-04-01 | End: 2024-04-01 | Stop reason: HOSPADM

## 2024-04-01 RX ORDER — PROPOFOL 10 MG/ML
INJECTION, EMULSION INTRAVENOUS PRN
Status: DISCONTINUED | OUTPATIENT
Start: 2024-04-01 | End: 2024-04-01

## 2024-04-01 RX ORDER — GLYCOPYRROLATE 0.2 MG/ML
INJECTION, SOLUTION INTRAMUSCULAR; INTRAVENOUS PRN
Status: DISCONTINUED | OUTPATIENT
Start: 2024-04-01 | End: 2024-04-01

## 2024-04-01 RX ORDER — OXYCODONE HCL 5 MG/5 ML
5 SOLUTION, ORAL ORAL
Status: DISCONTINUED | OUTPATIENT
Start: 2024-04-01 | End: 2024-04-01 | Stop reason: HOSPADM

## 2024-04-01 RX ORDER — KETOROLAC TROMETHAMINE 10 MG/1
10 TABLET, FILM COATED ORAL EVERY 6 HOURS PRN
Qty: 20 TABLET | Refills: 0 | Status: SHIPPED | OUTPATIENT
Start: 2024-04-01

## 2024-04-01 RX ORDER — SODIUM CHLORIDE, SODIUM LACTATE, POTASSIUM CHLORIDE, CALCIUM CHLORIDE 600; 310; 30; 20 MG/100ML; MG/100ML; MG/100ML; MG/100ML
INJECTION, SOLUTION INTRAVENOUS CONTINUOUS
Status: DISCONTINUED | OUTPATIENT
Start: 2024-04-01 | End: 2024-04-01 | Stop reason: HOSPADM

## 2024-04-01 RX ORDER — ONDANSETRON 2 MG/ML
INJECTION INTRAMUSCULAR; INTRAVENOUS PRN
Status: DISCONTINUED | OUTPATIENT
Start: 2024-04-01 | End: 2024-04-01

## 2024-04-01 RX ORDER — SCOLOPAMINE TRANSDERMAL SYSTEM 1 MG/1
1 PATCH, EXTENDED RELEASE TRANSDERMAL ONCE
Status: DISCONTINUED | OUTPATIENT
Start: 2024-04-01 | End: 2024-04-01 | Stop reason: HOSPADM

## 2024-04-01 RX ORDER — FENTANYL CITRATE 50 UG/ML
50 INJECTION, SOLUTION INTRAMUSCULAR; INTRAVENOUS
Status: DISCONTINUED | OUTPATIENT
Start: 2024-04-01 | End: 2024-04-01 | Stop reason: HOSPADM

## 2024-04-01 RX ORDER — FENTANYL CITRATE 50 UG/ML
INJECTION, SOLUTION INTRAMUSCULAR; INTRAVENOUS PRN
Status: DISCONTINUED | OUTPATIENT
Start: 2024-04-01 | End: 2024-04-01

## 2024-04-01 RX ORDER — ACETAMINOPHEN 325 MG/1
975 TABLET ORAL ONCE
Status: DISCONTINUED | OUTPATIENT
Start: 2024-04-01 | End: 2024-04-01 | Stop reason: HOSPADM

## 2024-04-01 RX ORDER — LIDOCAINE HYDROCHLORIDE 10 MG/ML
INJECTION, SOLUTION INFILTRATION; PERINEURAL PRN
Status: DISCONTINUED | OUTPATIENT
Start: 2024-04-01 | End: 2024-04-01

## 2024-04-01 RX ORDER — KETOROLAC TROMETHAMINE 30 MG/ML
INJECTION, SOLUTION INTRAMUSCULAR; INTRAVENOUS PRN
Status: DISCONTINUED | OUTPATIENT
Start: 2024-04-01 | End: 2024-04-01

## 2024-04-01 RX ORDER — CEFAZOLIN SODIUM 1 G/3ML
INJECTION, POWDER, FOR SOLUTION INTRAMUSCULAR; INTRAVENOUS PRN
Status: DISCONTINUED | OUTPATIENT
Start: 2024-04-01 | End: 2024-04-01

## 2024-04-01 RX ORDER — KETAMINE HYDROCHLORIDE 10 MG/ML
INJECTION INTRAMUSCULAR; INTRAVENOUS PRN
Status: DISCONTINUED | OUTPATIENT
Start: 2024-04-01 | End: 2024-04-01

## 2024-04-01 RX ORDER — ACETAMINOPHEN 325 MG/1
975 TABLET ORAL ONCE
Qty: 3 TABLET | Refills: 0 | Status: DISCONTINUED | OUTPATIENT
Start: 2024-04-01 | End: 2024-04-01

## 2024-04-01 RX ADMIN — ACETAMINOPHEN 975 MG: 325 TABLET ORAL at 10:05

## 2024-04-01 RX ADMIN — Medication 10 MG: at 12:25

## 2024-04-01 RX ADMIN — KETOROLAC TROMETHAMINE 15 MG: 30 INJECTION, SOLUTION INTRAMUSCULAR at 13:22

## 2024-04-01 RX ADMIN — FENTANYL CITRATE 50 MCG: 50 INJECTION INTRAMUSCULAR; INTRAVENOUS at 12:57

## 2024-04-01 RX ADMIN — SODIUM CHLORIDE, POTASSIUM CHLORIDE, SODIUM LACTATE AND CALCIUM CHLORIDE: 600; 310; 30; 20 INJECTION, SOLUTION INTRAVENOUS at 13:40

## 2024-04-01 RX ADMIN — GLYCOPYRROLATE 0.2 MG: 0.2 INJECTION INTRAMUSCULAR; INTRAVENOUS at 12:35

## 2024-04-01 RX ADMIN — Medication 5 MG: at 12:33

## 2024-04-01 RX ADMIN — DEXAMETHASONE SODIUM PHOSPHATE 10 MG: 10 INJECTION, SOLUTION INTRAMUSCULAR; INTRAVENOUS at 12:14

## 2024-04-01 RX ADMIN — FENTANYL CITRATE 50 MCG: 50 INJECTION INTRAMUSCULAR; INTRAVENOUS at 12:02

## 2024-04-01 RX ADMIN — LIDOCAINE HYDROCHLORIDE 50 MG: 10 INJECTION, SOLUTION INFILTRATION; PERINEURAL at 12:14

## 2024-04-01 RX ADMIN — ONDANSETRON 4 MG: 2 INJECTION INTRAMUSCULAR; INTRAVENOUS at 13:13

## 2024-04-01 RX ADMIN — SCOPALAMINE 1 PATCH: 1 PATCH, EXTENDED RELEASE TRANSDERMAL at 10:39

## 2024-04-01 RX ADMIN — Medication 10 MG: at 12:28

## 2024-04-01 RX ADMIN — SODIUM CHLORIDE, POTASSIUM CHLORIDE, SODIUM LACTATE AND CALCIUM CHLORIDE: 600; 310; 30; 20 INJECTION, SOLUTION INTRAVENOUS at 13:15

## 2024-04-01 RX ADMIN — SODIUM CHLORIDE, POTASSIUM CHLORIDE, SODIUM LACTATE AND CALCIUM CHLORIDE: 600; 310; 30; 20 INJECTION, SOLUTION INTRAVENOUS at 10:16

## 2024-04-01 RX ADMIN — DEXMEDETOMIDINE HYDROCHLORIDE 8 MCG: 100 INJECTION, SOLUTION INTRAVENOUS at 13:10

## 2024-04-01 RX ADMIN — KETAMINE HYDROCHLORIDE 50 MG: 10 INJECTION INTRAMUSCULAR; INTRAVENOUS at 12:14

## 2024-04-01 RX ADMIN — CEFAZOLIN 2 G: 1 INJECTION, POWDER, FOR SOLUTION INTRAMUSCULAR; INTRAVENOUS at 12:02

## 2024-04-01 RX ADMIN — PROPOFOL 200 MG: 10 INJECTION, EMULSION INTRAVENOUS at 12:14

## 2024-04-01 ASSESSMENT — ACTIVITIES OF DAILY LIVING (ADL)
ADLS_ACUITY_SCORE: 36

## 2024-04-01 NOTE — DISCHARGE INSTRUCTIONS
Home Care After  Cystoscopy and Right Ureteroscopy  The following instructions will help you care for yourself, or be cared for upon your return home today. These are guidelines for your care right after surgery only.     Diet   Drink plenty of liquids and eat light meals today.   Start your regular diet tomorrow.     Activity   Start normal activities in twenty-four (24) hours.     Wound Care and Hygiene   No restrictions, start normal routine.     Anesthesia Precautions & Expectations   After anesthesia, rest for 24 hours.   Do not drive, drink alcoholic beverages or make any important decisions during this time. General anesthesia may cause a sore throat, jaw discomfort or muscle aches.   These symptoms can last for one or two days.     What to Expect after Surgery   Mild pain with voiding.   Frequency or urgency.   Bladder cramps.   Minimal bleeding with voiding.     Medications   Take tylenol every 6 hours for the next 2 days for pain control. ***alternate with ibuprofen every 6 hours (take tylenol, then 3 hours later take ibuprofen, then 3 hours later take tylenol, etc.)   ***Take toradol every 6 hours as needed. Do not take other NSAIDs with it (Advil, Motrin, Ibuprofen, etc.)  ****Take narcotic pain medications as needed for breakthrough pain. Take an over the counter stool softener while on narcotic medications   ***You will also be sent with oxybutynin. You can take this up to 3 times a day for bladder irritation including frequency, urgency, and bladder cramps. It can have side effects of constipation, dry eyes, and dry mouth so consider and over the counter stool softener while taking it.     Call your Doctor   Passing clots in urine preventing bladder emptying   Severe pain not controlled by oral medication   Temperature above 100.5 degrees   Inability to urinate within eight (8) hours after surgery     After Blakely Placement***   It is common to have feeling of bladder urgency and need to urinate.  Please make sure the catheter is draining well.   If the bladder spasms persist, you can take an antispasm medication (Levsin or oxybutynin) to help with the symptoms.   These medications can cause constipation and dry eyes so take an over the counter stool softener if needed. Stop taking antispasm medications 48 hours prior to catheter removal.     After Stent Placement***   It is common to have blood tinged urine for 3-5 days.   It is common to have pain in your side and in your back when you urinate for 3-5 days.   It is common to have urgency with urination.   This is a temporary stent and will need to be pulled at the next appointment*** exchanged within 3   months***. This is a temporary stent and will be exchanged at the time of the procedure to treat your stone. ***     Other Contacts   [facility] [phone]    Other Instructions   ***     Follow up Appointment   ***     If it is a question or problem that can wait until business hours, call the clinic phone number listed above when the clinic is open during weekdays, 8am-4pm.   If you are unable to reach your doctor and it is a medical emergency call your local  Emergency Department or dial 911.

## 2024-04-01 NOTE — INTERVAL H&P NOTE
"I have reviewed the surgical (or preoperative) H&P that is linked to this encounter, and examined the patient. Noted changes include: Patient diagnosed with COVID prior to her last surgery. She is now 14 days out from her diagnosis. Symptoms resolved    Clinical Conditions Present on Arrival:  Clinically Significant Risk Factors Present on Admission                  # Obesity: Estimated body mass index is 33.69 kg/m  as calculated from the following:    Height as of this encounter: 1.638 m (5' 4.5\").    Weight as of this encounter: 90.4 kg (199 lb 6 oz).       "

## 2024-04-01 NOTE — ANESTHESIA PROCEDURE NOTES
Airway       Patient location during procedure: OR  Staff -        Anesthesiologist:  Ame Raymond MD       CRNA: Lorena Quiñonez APRN CRNA       Performed By: CRNAIndications and Patient Condition       Indications for airway management: wily-procedural       Induction type:intravenous       Mask difficulty assessment: 1 - vent by mask    Final Airway Details       Final airway type: supraglottic airway    Supraglottic Airway Details        Type: LMA       Brand: Ambu AuraGain       LMA size: 4    Post intubation assessment        Placement verified by: capnometry, equal breath sounds and chest rise        Number of attempts at approach: 1       Number of other approaches attempted: 0       Secured with: tape       Ease of procedure: easy       Dentition: Intact and Unchanged

## 2024-04-01 NOTE — ANESTHESIA PREPROCEDURE EVALUATION
Anesthesia Pre-Procedure Evaluation    Patient: Jada Billings   MRN: [id number] : 1950        Procedure : Procedure(s):  CYSTOSCOPY, RIGHT RETROGRADE PYELOGRAM, RIGHT URETEROSCOPY AND LASER LITHOTRIPSY WITH BASKET EXTRACTION OF FRAGMENTS, POSSIBLE RIGHT STENT PLACEMENT          Past Medical History:   Diagnosis Date    Ankle fracture     right    Cervicalgia     Depression     Diverticulitis of large intestine     Dry eye syndrome     Endometriosis     Gastroesophageal reflux disease     Hyperlipidemia     Insomnia     Low back pain     OA (osteoarthritis)     Renal disease     Rotator cuff tear, right     Wrist fracture     left      Past Surgical History:   Procedure Laterality Date    ANKLE FRACTURE SURGERY Right     APPENDECTOMY      ARTHROPLASTY KNEE UNICOMPARTMENT Left 2017    Procedure: ARTHROPLASTY KNEE UNICOMPARTMENT;  Surgeon: Edin Abraham MD;  Location: [facility]    ARTHROSCOPY SHOULDER ROTATOR CUFF REPAIR Right     AS REVISE TOTAL HIP REPLACEMENT Right     BACK SURGERY      BREAST SURGERY      COLONOSCOPY      TOTAL HIP ARTHROPLASTY Bilateral     WRIST FRACTURE SURGERY Left     ZZC TOTAL HIP ARTHROPLASTY Bilateral       Allergies   Allergen Reactions    Penicillins Rash     PENICILLIN V POTASSIUM                                                                                                              [id number]          Anesthetics, Amide Itching     Urinary retention    Augmentin [Amoxicillin-Pot Clavulanate] Diarrhea      Social History     Tobacco Use    Smoking status: Never    Smokeless tobacco: Not on file   Substance Use Topics    Alcohol use: Not Currently     Comment: very rare      Wt Readings from Last 1 Encounters:   24 90.4 kg (199 lb 6 oz)        Anesthesia Evaluation   Pt has had prior anesthetic.     No history of anesthetic complications       ROS/MED HX  ENT/Pulmonary:       Neurologic:       Cardiovascular:       METS/Exercise Tolerance:     Hematologic:    "    Musculoskeletal:       GI/Hepatic:     (+) GERD,                   Renal/Genitourinary:     (+) renal disease,             Endo:       Psychiatric/Substance Use:       Infectious Disease:       Malignancy:       Other:            Physical Exam    Airway        Mallampati: II    Neck ROM: full     Respiratory Devices and Support         Dental       (+) Minor Abnormalities - some fillings, tiny chips      Cardiovascular   cardiovascular exam normal          Pulmonary   pulmonary exam normal                OUTSIDE LABS:  CBC: No results found for: \"WBC\", \"HGB\", \"HCT\", \"PLT\"  BMP: No results found for: \"NA\", \"POTASSIUM\", \"CHLORIDE\", \"CO2\", \"BUN\", \"CR\", \"GLC\"  COAGS: No results found for: \"PTT\", \"INR\", \"FIBR\"  POC: No results found for: \"BGM\", \"HCG\", \"HCGS\"  HEPATIC: No results found for: \"ALBUMIN\", \"PROTTOTAL\", \"ALT\", \"AST\", \"GGT\", \"ALKPHOS\", \"BILITOTAL\", \"BILIDIRECT\", \"ALIZA\"  OTHER: No results found for: \"PH\", \"LACT\", \"A1C\", \"BLAYNE\", \"PHOS\", \"MAG\", \"LIPASE\", \"AMYLASE\", \"TSH\", \"T4\", \"T3\", \"CRP\", \"SED\"    Anesthesia Plan    ASA Status:  2       Anesthesia Type: General.     - Airway: LMA              Consents    Anesthesia Plan(s) and associated risks, benefits, and realistic alternatives discussed. Questions answered and patient/representative(s) expressed understanding.     - Discussed: Risks, Benefits and Alternatives for the PROCEDURE were discussed     - Discussed with:  Patient      - Extended Intubation/Ventilatory Support Discussed: No.      - Patient is DNR/DNI Status: No     Use of blood products discussed: No .     Postoperative Care    Pain management: Multi-modal analgesia.   PONV prophylaxis: Ondansetron (or other 5HT-3), Dexamethasone or Solumedrol, Scopolamine patch     Comments:               Ame Raymond MD    I have reviewed the pertinent notes and labs in the chart from the past 30 days and (re)examined the patient.  Any updates or changes from those notes are reflected in this note.            " "  # Obesity: Estimated body mass index is 33.69 kg/m  as calculated from the following:    Height as of this encounter: 1.638 m (5' 4.5\").    Weight as of this encounter: 90.4 kg (199 lb 6 oz).      "

## 2024-04-01 NOTE — OR NURSING
Returned VM to [facility] [phone] regarding COVID clearance. Unable to be dispatched thru to clinic. There was a note in pt's [facility] chart that patient was left a VM that she should cancel and reschedule procedure in 6 weeks.    Relayed message to OR control/ TIMI.

## 2024-04-01 NOTE — ANESTHESIA POSTPROCEDURE EVALUATION
Patient: Jada Billings    Procedure: Procedure(s):  CYSTOSCOPY, RIGHT RETROGRADE PYELOGRAM, RIGHT URETEROSCOPY AND LASER LITHOTRIPSY WITH BASKET EXTRACTION OF FRAGMENTS, POSSIBLE RIGHT STENT PLACEMENT       Anesthesia Type:  General    Note:  Disposition: Inpatient   Postop Pain Control: Uneventful            Sign Out: Well controlled pain   PONV: No   Neuro/Psych: Uneventful            Sign Out: Acceptable/Baseline neuro status   Airway/Respiratory: Uneventful            Sign Out: Acceptable/Baseline resp. status   CV/Hemodynamics: Uneventful            Sign Out: Acceptable CV status; No obvious hypovolemia; No obvious fluid overload   Other NRE:    DID A NON-ROUTINE EVENT OCCUR?        Last vitals:  Vitals Value Taken Time   /77 04/01/24 1415   Temp 36.2  C (97.1  F) 04/01/24 1400   Pulse 67 04/01/24 1421   Resp 14 04/01/24 1415   SpO2 94 % 04/01/24 1421   Vitals shown include unfiled device data.    Electronically Signed By: Ty Cole MD  April 1, 2024  4:15 PM

## 2024-04-01 NOTE — BRIEF OP NOTE
[facility]    Brief Operative Note    Pre-operative diagnosis: Calculus of kidney [N20.0]  Post-operative diagnosis Same as pre-operative diagnosis    Procedure: CYSTOSCOPY, RIGHT RETROGRADE PYELOGRAM, RIGHT URETEROSCOPY AND LASER LITHOTRIPSY WITH BASKET EXTRACTION OF FRAGMENTS, POSSIBLE RIGHT STENT PLACEMENT, Right - Ureter    Surgeon: Surgeon(s) and Role:     * Reva Tirado MD - Primary  Anesthesia: General   Estimated Blood Loss: Minimal    Drains: None  Specimens:   ID Type Source Tests Collected by Time Destination   A : Right kidney stones Calculus/Stone Kidney, Right STONE ANALYSIS Reva Tirado MD 4/1/2024  1:06 PM      Findings:   Right kidney stone. Tortous proximal ureter.  Complications: None.  Implants:   Implant Name Type Inv. Item Serial No.  Lot No. LRB No. Used Action   STENT URETERAL PERCUFLEX PLUS [id number] - [id number] Stent STENT URETERAL PERCUFLEX PLUS [id number]  [employer] [id number] Right 1 Implanted

## 2024-04-01 NOTE — OR NURSING
Callback from [facility]. Pt advised to reschedule in 6 weeks based on anesthesia guidelines that if pt has been covid positive and symptomatic, it is advised to wait 6 weeks before undergoing anesthesia.   Updated [facility]I charge.

## 2024-04-01 NOTE — ANESTHESIA CARE TRANSFER NOTE
Patient: Jada Billings    Procedure: Procedure(s):  CYSTOSCOPY, RIGHT RETROGRADE PYELOGRAM, RIGHT URETEROSCOPY AND LASER LITHOTRIPSY WITH BASKET EXTRACTION OF FRAGMENTS, POSSIBLE RIGHT STENT PLACEMENT       Diagnosis: Calculus of kidney [N20.0]  Diagnosis Additional Information: No value filed.    Anesthesia Type:   General     Note:    Oropharynx: oropharynx clear of all foreign objects  Level of Consciousness: awake  Oxygen Supplementation: face mask  Level of Supplemental Oxygen (L/min / FiO2): 6  Independent Airway: airway patency satisfactory and stable  Dentition: dentition unchanged  Vital Signs Stable: post-procedure vital signs reviewed and stable  Report to RN Given: handoff report given  Patient transferred to: PACU    Handoff Report: Identifed the Patient, Identified the Reponsible Provider, Reviewed the pertinent medical history, Discussed the surgical course, Reviewed Intra-OP anesthesia mangement and issues during anesthesia, Set expectations for post-procedure period and Allowed opportunity for questions and acknowledgement of understanding      Vitals:  Vitals Value Taken Time   /65 04/01/24 1331   Temp 98    Pulse 80    Resp 12    SpO2 100    Vitals shown include unfiled device data.    Electronically Signed By: MELA Pascual CRNA  April 1, 2024  1:32 PM

## 2024-04-01 NOTE — OR NURSING
Connected to [place]. Pt's care team is out for the week and she is waiting for covering MD to arrive. Will ask ? Whether pt is cleared for surgery after covid + 3/18/24.

## 2024-04-01 NOTE — OP NOTE
Preoperative diagnosis   right kidney stone     Postoperative diagnosis   right kidney stone     Procedure performed   1. Rigid cystoscopy, right retrograde pyelogram with ureteral stent placement, 6 x 24  2. right ureteroscopy with holmium-YAG laser lithotripsy and basket extraction of   stone fragments   3. Fluoroscopy time < 1 hour with interpretation of images     Attending surgeon   Reva Tirado MD    Anesthesia   General     Complications   None     EBL  3 ml    Specimen   Stone fragments for biochemical analysis     Findings   Urethroscopy revealed no strictures or other abnormalities.   Cystoscopy revealed no tumors, stones or other mucosal abnormalities.   right retrograde pyelogram revealed a delicate system with identification of the stone seen on pre-op imaging. No other filling defects and caliber of right ureter was smooth and normal. Mild Tortuosity of the proximal right ureter was noted.   Ureteroscopy revealed normal ureter without stricture or tumor.     Indications   73 year old female agreed to undergo the above named procedure after discussion of the alternatives, risks and benefits. Informed consent was obtained.     Procedure   The patient was taken to the operating room and placed supine on the operating table. Pre-operative antibiotics were administered. Bilateral lower extremity SCDs were placed. After induction of general anesthesia the patient was positioned in dorsal lithotomy, prepped and draped in a sterile fashion. A time-out was performed.     A 22-Malaysian rigid cystoscope was passed carefully via urethra into the bladder. The right ureteral orifice was identified and a Sensor wire was passed retrograde to the level of the kidney and confirmed by fluoroscopy. The scope was off-loaded and the bladder emptied. A 5F catheter was passed over the wire and a retrograde pyelogram was performed by slowly injecting 5 mL of 50% Omnipaque contrast via the 5-Malaysian catheter with findings  described above. He wire was replaced and an 8/10 dilator was passed to the level of the distal ureter. The 8F was removed and a Russell wire was then placed to the level of the renal pelvis confirmed by fluoroscopy. The 10F dilator was then withdrawn.  An 11-13, 28-cm access sheath was advanced over the Russell wire to level of the UPJ under direct fluoroscopic guidance. The inner stylet and super-stiff wire were removed. The kidney was entered with the Inverted Edge flexible ureteroscope. The kidney stone was identified and dusted with a 200-micron holmium YAG laser fiber at laser settings of 0.2 joules and a frequency of 50 Hz. The fragments were basket extracted. At the completion of the procedure, all clinically significant fragments were removed and only dust- like fragments remained. A retrograde pyelgram was performed though the scope. The access sheath was removed under direct vision. Ureteral edema but no obvious obstruction was present. A 6 x 24 Yoruba stent was positioned over the safety wire with the upper end in the upper pole and the lower in the bladder confirmed by fluoroscopy. The bladder was emptied and the procedure was complete. The patient tolerated the procedure well and was stable throughout.     Reva Tirado MD was present in the procedure room the entire duration of the case.

## 2024-04-01 NOTE — DISCHARGE INSTRUCTIONS
Home Care After  Cystoscopy and right Ureteroscopy  The following instructions will help you care for yourself, or be cared for upon your return home today. These are guidelines for your care right after surgery only.     Diet   Drink plenty of liquids and eat light meals today.   Start your regular diet tomorrow.     Activity   Start normal activities in twenty-four (24) hours.     Wound Care and Hygiene   No restrictions, start normal routine.     Anesthesia Precautions & Expectations   After anesthesia, rest for 24 hours.   Do not drive, drink alcoholic beverages or make any important decisions during this time. General anesthesia may cause a sore throat, jaw discomfort or muscle aches.   These symptoms can last for one or two days.     What to Expect after Surgery   Mild pain with voiding.   Frequency or urgency.   Bladder cramps.   Minimal bleeding with voiding.     Medications   Take tylenol every 6 hours for the next 2 days for pain control.   Take toradol every 6 hours as needed. Do not take other NSAIDs with it (Advil, Motrin, Ibuprofen, etc.)      Call your Doctor   Passing clots in urine preventing bladder emptying   Severe pain not controlled by oral medication   Temperature above 100.5 degrees   Inability to urinate within eight (8) hours after surgery     After Stent Placement  It is common to have blood tinged urine for 3-5 days.   It is common to have pain in your side and in your back when you urinate for 3-5 days.   It is common to have urgency with urination.   This is a temporary stent and will need to be pulled at the next appointment.    Other Contacts   [facility] [phone]      Follow up Appointment   Office will call with appointment for stent removal about 1 week after surgery. If you do not hear from us in 3 days, please call.  You will also have 3 month follow-up with a ultrasound prior.     If it is a question or problem that can wait until business hours, call the clinic phone  number listed above when the clinic is open during weekdays, 8am-4pm.   If you are unable to reach your doctor and it is a medical emergency call your local  Emergency Department or dial 911.

## 2024-04-03 LAB
APPEARANCE STONE: NORMAL
COMPN STONE: NORMAL
SPECIMEN WT: 24 MG

## 2024-05-20 NOTE — ADDENDUM NOTE
Addendum  created 05/20/24 1203 by Ame Raymond MD    Attestation recorded in Intraprocedure, Intraprocedure Attestations filed

## 2025-02-23 ENCOUNTER — HEALTH MAINTENANCE LETTER (OUTPATIENT)
Age: 75
End: 2025-02-23

## 2025-04-14 ENCOUNTER — TELEPHONE (OUTPATIENT)
Dept: URGENT CARE | Facility: URGENT CARE | Age: 75
End: 2025-04-14
Payer: COMMERCIAL

## 2025-04-14 DIAGNOSIS — M17.11 PRIMARY OSTEOARTHRITIS OF RIGHT KNEE: Primary | ICD-10-CM

## 2025-04-14 NOTE — TELEPHONE ENCOUNTER
Pt needs prior auth for synvisc for knee inj with Dr. Abreu.  Appt scheduled 2 weeks out to allow for this.

## 2025-04-14 NOTE — TELEPHONE ENCOUNTER
Patient scheduled for appointment on 4/29/25 @ [facility] for discussion of viscosupplementation injection vs steroid injection of right knee.        SynviscOne injection last completed 5/18/23.  Patient reports relief.     Patient has failed 3 month trial of Pharmacologic Approach (e.g., topical NSAIDs, oral NSAIDs with or without oral proton pump inhibitors, NELSON-2 inhibitors, topical capsaicin, acetaminophen, tramadol, duloxetine, etc.):  Yes     Patient has completed 3 month trial of Non-Pharmacologic treatments (i.e., physical, psychosocial, or mind-body approach (e.g., exercise-land based or aquatic, physical therapy, alfonso chi, yoga, weight management, cognitive behavioral therapy, knee brace or cane, etc).  Yes    Has patient had prior reaction to Synvisc/SynviscOne or any alternative HA product?: No    Prior authorization referral for SynviscOne injection placed.    Willow Lucio, ATC

## 2025-04-29 ENCOUNTER — OFFICE VISIT (OUTPATIENT)
Dept: ORTHOPEDICS | Facility: CLINIC | Age: 75
End: 2025-04-29
Payer: COMMERCIAL

## 2025-04-29 ENCOUNTER — ANCILLARY PROCEDURE (OUTPATIENT)
Dept: GENERAL RADIOLOGY | Facility: CLINIC | Age: 75
End: 2025-04-29
Attending: FAMILY MEDICINE
Payer: COMMERCIAL

## 2025-04-29 DIAGNOSIS — M17.11 PRIMARY OSTEOARTHRITIS OF RIGHT KNEE: ICD-10-CM

## 2025-04-29 DIAGNOSIS — M17.11 PRIMARY OSTEOARTHRITIS OF RIGHT KNEE: Primary | ICD-10-CM

## 2025-04-29 PROCEDURE — 20611 DRAIN/INJ JOINT/BURSA W/US: CPT | Mod: RT | Performed by: FAMILY MEDICINE

## 2025-04-29 PROCEDURE — 73562 X-RAY EXAM OF KNEE 3: CPT | Mod: TC | Performed by: RADIOLOGY

## 2025-04-29 NOTE — LETTER
4/29/2025      Jada Billings  [address] [facility] [address]      Dear Colleague,    Thank you for referring your patient, Jada Billings, to the [facility] [place]. Please see a copy of my visit note below.    ASSESSMENT & PLAN    Neeraj was seen today for follow up.    Diagnoses and all orders for this visit:    Primary osteoarthritis of right knee  -     XR Knee Standing AP [place] Bilat Lat Right; Future  -     Large Joint Injection/Arthocentesis: R knee joint        # Right Knee Arthritis: Jada Billings  was seen today for right knee pain. Symptoms had been going on for 2+ years worsening over the past month or so without inciting injury. On examination there are positive findings of joint line tenderness. Imaging findings showed medial knee arthritis. Likely cause of patient's condition due to flare of knee arthritis. Other possible conditions contributing to symptoms include insufficiency fracture. Previous gel injection helped for 1 year.  Counseled patient on nature of condition and treatment options.  Given this plan as below, follow-up 1 mon as needed.     Image Findings: right knee arthritis on repeat x-rays  Treatment: Activities as tolerated, home exercises given today  Medications/Injections: Limited tylenol/ibuprofen for pain for 1-2 weeks, Topical Voltaren gel, right knee Synvisc injection  Follow-up: In one month if symptoms do not improve, sooner if worsening  Can consider steroid injection     -----    SUBJECTIVE:  Jada Billings is a 74 year old female who is seen in follow-up for right knee pain.They were last seen 5/18/23 and right knee Synvisc One injection was performed.  Provided moderate relief for 1 year. The patient is seen by themselves.    Since their last visit reports returned posterior knee pain.  Pain with sit to stand transition.  Feeling instability when first standing up. They have tried right knee Synvisc One injection  5/18/23.        Patient's past medical, surgical, social, and family histories were reviewed today and no changes are noted.    REVIEW OF SYSTEMS:  Constitutional: NEGATIVE for fever, chills, change in weight  Skin: NEGATIVE for worrisome rashes, moles or lesions  GI/: NEGATIVE for bowel or bladder changes  Neuro: NEGATIVE for weakness, dizziness or paresthesias    OBJECTIVE:  There were no vitals taken for this visit.   General: healthy, alert and in no distress  HEENT: no scleral icterus or conjunctival erythema  Skin: no suspicious lesions or rash. No jaundice.  CV: regular rhythm by palpation, no pedal edema  Resp: normal respiratory effort without conversational dyspnea   Psych: normal mood and affect  Gait: normal steady gait with appropriate coordination and balance  Neuro: normal light touch sensory exam of the extremities.    MSK:    RIGHT KNEE  Inspection:    Normal alignment; no edema, erythema, or ecchymosis present  Palpation:    Tender about the lateral joint line and medial joint line. Remainder of bony and ligamentous landmarks are nontender.    No effusion is present    Patellofemoral crepitus is Present  Range of Motion:     00 extension to 1350 flexion  Strength:    Quadriceps 5/5, hamstrings 5/5, gastrocsoleus 5/5, and tibialis anterior 5/5    Extensor mechanism intact  Special Tests:    Positive: None    Negative: Patellar grind, MCL/valgus stress (0 & 30 deg), LCL/varus stress (0 & 30 deg), Lachman's    Independent visualization of the below image:    Moderate to severe medial right knee arthritis    Richie Abreu MD, [facility]    Disclaimer: This note consists of symbols derived from keyboarding, dictation and/or voice recognition software. As a result, there may be errors in the script that have gone undetected. Please consider this when interpreting information found in this chart.    Large Joint Injection/Arthocentesis: R knee joint    Date/Time:  4/29/2025 2:28 PM    Performed by: Richie Abreu MD  Authorized by: Richie Abreu MD    Indications:  Pain and osteoarthritis  Needle Size:  21 G  Guidance: ultrasound    Approach:  Superolateral  Location:  Knee      Medications:  48 mg hylan 48 MG/6ML  Outcome:  Tolerated well, no immediate complications  Procedure discussed: discussed risks, benefits, and alternatives    Consent Given by:  Patient  Timeout: timeout called immediately prior to procedure    Prep: patient was prepped and draped in usual sterile fashion     Ultrasound images of procedure were permanently stored.     Patient tolerated right knee hyaluronic acid injection today.  Aftercare instructions given to patient.  Plan to follow-up as previously discussed with referring provider.  Ultrasound guided images were permanently stored.     Richie Abreu MD [facility]                  Again, thank you for allowing me to participate in the care of your patient.        Sincerely,        Richie Abreu MD    Electronically signed

## 2025-04-29 NOTE — PROGRESS NOTES
ASSESSMENT & PLAN    Neeraj was seen today for follow up.    Diagnoses and all orders for this visit:    Primary osteoarthritis of right knee  -     XR Knee Standing AP [place] Bilat Lat Right; Future  -     Large Joint Injection/Arthocentesis: R knee joint        # Right Knee Arthritis: Jada Billings  was seen today for right knee pain. Symptoms had been going on for 2+ years worsening over the past month or so without inciting injury. On examination there are positive findings of joint line tenderness. Imaging findings showed medial knee arthritis. Likely cause of patient's condition due to flare of knee arthritis. Other possible conditions contributing to symptoms include insufficiency fracture. Previous gel injection helped for 1 year.  Counseled patient on nature of condition and treatment options.  Given this plan as below, follow-up 1 mon as needed.     Image Findings: right knee arthritis on repeat x-rays  Treatment: Activities as tolerated, home exercises given today  Medications/Injections: Limited tylenol/ibuprofen for pain for 1-2 weeks, Topical Voltaren gel, right knee Synvisc injection  Follow-up: In one month if symptoms do not improve, sooner if worsening  Can consider steroid injection     -----    SUBJECTIVE:  Jada Billings is a 74 year old female who is seen in follow-up for right knee pain.They were last seen 5/18/23 and right knee Synvisc One injection was performed.  Provided moderate relief for 1 year. The patient is seen by themselves.    Since their last visit reports returned posterior knee pain.  Pain with sit to stand transition.  Feeling instability when first standing up. They have tried right knee Synvisc One injection 5/18/23.        Patient's past medical, surgical, social, and family histories were reviewed today and no changes are noted.    REVIEW OF SYSTEMS:  Constitutional: NEGATIVE for fever, chills, change in weight  Skin: NEGATIVE for worrisome rashes, moles or  lesions  GI/: NEGATIVE for bowel or bladder changes  Neuro: NEGATIVE for weakness, dizziness or paresthesias    OBJECTIVE:  There were no vitals taken for this visit.   General: healthy, alert and in no distress  HEENT: no scleral icterus or conjunctival erythema  Skin: no suspicious lesions or rash. No jaundice.  CV: regular rhythm by palpation, no pedal edema  Resp: normal respiratory effort without conversational dyspnea   Psych: normal mood and affect  Gait: normal steady gait with appropriate coordination and balance  Neuro: normal light touch sensory exam of the extremities.    MSK:    RIGHT KNEE  Inspection:    Normal alignment; no edema, erythema, or ecchymosis present  Palpation:    Tender about the lateral joint line and medial joint line. Remainder of bony and ligamentous landmarks are nontender.    No effusion is present    Patellofemoral crepitus is Present  Range of Motion:     00 extension to 1350 flexion  Strength:    Quadriceps 5/5, hamstrings 5/5, gastrocsoleus 5/5, and tibialis anterior 5/5    Extensor mechanism intact  Special Tests:    Positive: None    Negative: Patellar grind, MCL/valgus stress (0 & 30 deg), LCL/varus stress (0 & 30 deg), Lachman's    Independent visualization of the below image:    Moderate to severe medial right knee arthritis    Richie Abreu MD, [facility]    Disclaimer: This note consists of symbols derived from keyboarding, dictation and/or voice recognition software. As a result, there may be errors in the script that have gone undetected. Please consider this when interpreting information found in this chart.    Large Joint Injection/Arthocentesis: R knee joint    Date/Time: 4/29/2025 2:28 PM    Performed by: Richie Abreu MD  Authorized by: Richie Abreu MD    Indications:  Pain and osteoarthritis  Needle Size:  21 G  Guidance: ultrasound    Approach:  Superolateral  Location:  Knee      Medications:  48 mg hylan 48  MG/6ML  Outcome:  Tolerated well, no immediate complications  Procedure discussed: discussed risks, benefits, and alternatives    Consent Given by:  Patient  Timeout: timeout called immediately prior to procedure    Prep: patient was prepped and draped in usual sterile fashion     Ultrasound images of procedure were permanently stored.     Patient tolerated right knee hyaluronic acid injection today.  Aftercare instructions given to patient.  Plan to follow-up as previously discussed with referring provider.  Ultrasound guided images were permanently stored.     Richie Abreu MD [facility]

## 2025-04-29 NOTE — PATIENT INSTRUCTIONS
# Right Knee Arthritis: Jada Billings  was seen today for right knee pain. Symptoms had been going on for 2+ years worsening over the past month or so without inciting injury. On examination there are positive findings of joint line tenderness. Imaging findings showed medial knee arthritis. Likely cause of patient's condition due to flare of knee arthritis. Other possible conditions contributing to symptoms include insufficiency fracture. Previous gel injection helped for 1 year.  Counseled patient on nature of condition and treatment options.  Given this plan as below, follow-up 1 mon as needed.     Image Findings: right knee arthritis on repeat x-rays  Treatment: Activities as tolerated, home exercises given today  Medications/Injections: Limited tylenol/ibuprofen for pain for 1-2 weeks, Topical Voltaren gel, right knee Synvisc injection  Follow-up: In one month if symptoms do not improve, sooner if worsening  Can consider steroid injection     Please call [phone]   Ask for my team if you have any questions or concerns    If you have not yet received the influenza vaccine but would like to get one, please call  [phone] or you can schedule via [employer]    It was great seeing you again today!    Richie Abreu MD, CAM     FS Injection Discharge Instructions    Procedure: right knee hyaluronic acid injection     You may shower, however avoid swimming, tub baths or hot tubs for 24 hours following your procedure  You may have a mild to moderate increase in pain for several days following the injection.  It may take up to 30 days for the medication to start working although you may feel the effect as early as a few days after the procedure.  You may use ice packs for 10-15 minutes, 3 to 4 times a day at the injection site for comfort  You may use anti-inflammatory medications (such as Ibuprofen or Aleve or Advil) or Tylenol for pain control if necessary    If you experience any of the following,  [place] [facility] [place] [phone] or [phone]  -Fever over 100 degree F  -Swelling, bleeding, redness, drainage, warmth at the injection site  - New or worsening pain

## (undated) DEVICE — ADAPTER SCOPE UROLOK II LF M0067301400

## (undated) DEVICE — GUIDEWIRE SENSOR DUAL FLEX STR 0.035"X150CM M0066703080

## (undated) DEVICE — PREP DYNA-HEX 4% CHG SCRUB 4OZ BOTTLE MDS098710

## (undated) DEVICE — SOL WATER IRRIG 1000ML BOTTLE 2F7114

## (undated) DEVICE — Device

## (undated) DEVICE — TUBING SUCTION MEDI-VAC 1/4"X20' N620A

## (undated) DEVICE — CUSTOM PACK CYSTO PREFERRED SOT5BCYHEA

## (undated) DEVICE — KIT ENDO FIRST STEP DISINFECTANT 200ML W/POUCH EP-4

## (undated) DEVICE — ADAPTER CATH 403250

## (undated) DEVICE — SHEATH URETERAL ACCESS NAVIGATOR HD 11/13FRX28CM M0062502210

## (undated) DEVICE — GLOVE BIOGEL PI ULTRATOUCH G SZ 6.5 42165

## (undated) DEVICE — LASER FIBER HOLMIUM MOSES 200 D/F/L AC-10030100

## (undated) DEVICE — MAT FLOOR SURGICAL 40X38 0702140238

## (undated) DEVICE — TUBING SET THERMEDX UROLOGY SGL USE LL0006

## (undated) DEVICE — BASKET NITINOL TIPLESS HALO  1.5FRX120CM 554120

## (undated) DEVICE — CONTAINER URINE SPEC 4OZ STRL 1053

## (undated) DEVICE — SOLUTION IRRIG 2B7127 .9NS 3000ML BAG

## (undated) DEVICE — CATH URETERAL OPEN END 5FRX70CM M0064002010

## (undated) DEVICE — SPONGE RAY-TEC 4X8" 7318

## (undated) RX ORDER — LIDOCAINE HYDROCHLORIDE 10 MG/ML
INJECTION, SOLUTION EPIDURAL; INFILTRATION; INTRACAUDAL; PERINEURAL
Status: DISPENSED
Start: 2024-04-01

## (undated) RX ORDER — LIDOCAINE HYDROCHLORIDE 10 MG/ML
INJECTION, SOLUTION EPIDURAL; INFILTRATION; INTRACAUDAL; PERINEURAL
Status: DISPENSED
Start: 2018-05-21

## (undated) RX ORDER — GLYCOPYRROLATE 0.2 MG/ML
INJECTION, SOLUTION INTRAMUSCULAR; INTRAVENOUS
Status: DISPENSED
Start: 2024-04-01

## (undated) RX ORDER — DEXAMETHASONE SODIUM PHOSPHATE 10 MG/ML
INJECTION, SOLUTION INTRAMUSCULAR; INTRAVENOUS
Status: DISPENSED
Start: 2024-04-01

## (undated) RX ORDER — FENTANYL CITRATE 50 UG/ML
INJECTION, SOLUTION INTRAMUSCULAR; INTRAVENOUS
Status: DISPENSED
Start: 2024-04-01

## (undated) RX ORDER — PROPOFOL 10 MG/ML
INJECTION, EMULSION INTRAVENOUS
Status: DISPENSED
Start: 2024-04-01

## (undated) RX ORDER — EPHEDRINE SULFATE 50 MG/ML
INJECTION, SOLUTION INTRAMUSCULAR; INTRAVENOUS; SUBCUTANEOUS
Status: DISPENSED
Start: 2024-04-01

## (undated) RX ORDER — TRIAMCINOLONE ACETONIDE 40 MG/ML
INJECTION, SUSPENSION INTRA-ARTICULAR; INTRAMUSCULAR
Status: DISPENSED
Start: 2018-05-21

## (undated) RX ORDER — KETOROLAC TROMETHAMINE 30 MG/ML
INJECTION, SOLUTION INTRAMUSCULAR; INTRAVENOUS
Status: DISPENSED
Start: 2024-04-01

## (undated) RX ORDER — ONDANSETRON 2 MG/ML
INJECTION INTRAMUSCULAR; INTRAVENOUS
Status: DISPENSED
Start: 2024-04-01